# Patient Record
Sex: FEMALE | URBAN - METROPOLITAN AREA
[De-identification: names, ages, dates, MRNs, and addresses within clinical notes are randomized per-mention and may not be internally consistent; named-entity substitution may affect disease eponyms.]

---

## 2023-04-28 ENCOUNTER — INPATIENT (INPATIENT)
Facility: HOSPITAL | Age: 33
LOS: 1 days | Discharge: HOME | DRG: 57 | End: 2023-04-30
Attending: PSYCHIATRY & NEUROLOGY | Admitting: PSYCHIATRY & NEUROLOGY
Payer: SUBSIDIZED

## 2023-04-28 VITALS
RESPIRATION RATE: 18 BRPM | OXYGEN SATURATION: 97 % | DIASTOLIC BLOOD PRESSURE: 113 MMHG | SYSTOLIC BLOOD PRESSURE: 181 MMHG | WEIGHT: 293 LBS | HEART RATE: 95 BPM | HEIGHT: 69 IN | TEMPERATURE: 98 F

## 2023-04-28 DIAGNOSIS — I63.9 CEREBRAL INFARCTION, UNSPECIFIED: ICD-10-CM

## 2023-04-28 LAB
ALBUMIN SERPL ELPH-MCNC: 3.9 G/DL — SIGNIFICANT CHANGE UP (ref 3.3–5)
ALP SERPL-CCNC: 95 U/L — SIGNIFICANT CHANGE UP (ref 40–120)
ALT FLD-CCNC: 21 U/L — SIGNIFICANT CHANGE UP (ref 10–45)
ANION GAP SERPL CALC-SCNC: 11 MMOL/L — SIGNIFICANT CHANGE UP (ref 5–17)
APTT BLD: 28.7 SEC — SIGNIFICANT CHANGE UP (ref 27.5–35.5)
AST SERPL-CCNC: 13 U/L — SIGNIFICANT CHANGE UP (ref 10–40)
BASE EXCESS BLDV CALC-SCNC: 6.1 MMOL/L — HIGH (ref -2–3)
BILIRUB SERPL-MCNC: 0.1 MG/DL — LOW (ref 0.2–1.2)
BLOOD GAS VENOUS - CREATININE: SIGNIFICANT CHANGE UP MG/DL (ref 0.5–1.3)
BUN SERPL-MCNC: 10 MG/DL — SIGNIFICANT CHANGE UP (ref 7–23)
CA-I SERPL-SCNC: 1.21 MMOL/L — SIGNIFICANT CHANGE UP (ref 1.15–1.33)
CALCIUM SERPL-MCNC: 8.8 MG/DL — SIGNIFICANT CHANGE UP (ref 8.4–10.5)
CHLORIDE BLDV-SCNC: 105 MMOL/L — SIGNIFICANT CHANGE UP (ref 96–108)
CHLORIDE SERPL-SCNC: 102 MMOL/L — SIGNIFICANT CHANGE UP (ref 96–108)
CO2 BLDV-SCNC: 32 MMOL/L — HIGH (ref 22–26)
CO2 SERPL-SCNC: 28 MMOL/L — SIGNIFICANT CHANGE UP (ref 22–31)
CREAT SERPL-MCNC: 0.78 MG/DL — SIGNIFICANT CHANGE UP (ref 0.5–1.3)
EGFR: 103 ML/MIN/1.73M2 — SIGNIFICANT CHANGE UP
GAS PNL BLDV: 140 MMOL/L — SIGNIFICANT CHANGE UP (ref 136–145)
GAS PNL BLDV: SIGNIFICANT CHANGE UP
GAS PNL BLDV: SIGNIFICANT CHANGE UP
GLUCOSE BLDV-MCNC: 122 MG/DL — HIGH (ref 70–99)
GLUCOSE SERPL-MCNC: 119 MG/DL — HIGH (ref 70–99)
HCO3 BLDV-SCNC: 31 MMOL/L — HIGH (ref 22–29)
HCT VFR BLD CALC: 31.5 % — LOW (ref 34.5–45)
HCT VFR BLDA CALC: 31 % — LOW (ref 34.5–46.5)
HGB BLD CALC-MCNC: 10.2 G/DL — LOW (ref 11.7–16.1)
HGB BLD-MCNC: 9.8 G/DL — LOW (ref 11.5–15.5)
INR BLD: 1.1 RATIO — SIGNIFICANT CHANGE UP (ref 0.88–1.16)
LACTATE BLDV-MCNC: 1.5 MMOL/L — SIGNIFICANT CHANGE UP (ref 0.5–2)
MCHC RBC-ENTMCNC: 24.6 PG — LOW (ref 27–34)
MCHC RBC-ENTMCNC: 31.1 GM/DL — LOW (ref 32–36)
MCV RBC AUTO: 79.1 FL — LOW (ref 80–100)
PCO2 BLDV: 43 MMHG — HIGH (ref 39–42)
PH BLDV: 7.46 — HIGH (ref 7.32–7.43)
PLATELET # BLD AUTO: 385 K/UL — SIGNIFICANT CHANGE UP (ref 150–400)
PO2 BLDV: 62 MMHG — HIGH (ref 25–45)
POTASSIUM BLDV-SCNC: 3.1 MMOL/L — LOW (ref 3.5–5.1)
POTASSIUM SERPL-MCNC: 3.2 MMOL/L — LOW (ref 3.5–5.3)
POTASSIUM SERPL-SCNC: 3.2 MMOL/L — LOW (ref 3.5–5.3)
PROT SERPL-MCNC: 6.7 G/DL — SIGNIFICANT CHANGE UP (ref 6–8.3)
PROTHROM AB SERPL-ACNC: 12.7 SEC — SIGNIFICANT CHANGE UP (ref 10.5–13.4)
RBC # BLD: 3.98 M/UL — SIGNIFICANT CHANGE UP (ref 3.8–5.2)
RBC # FLD: 17.5 % — HIGH (ref 10.3–14.5)
SAO2 % BLDV: 92.2 % — HIGH (ref 67–88)
SODIUM SERPL-SCNC: 141 MMOL/L — SIGNIFICANT CHANGE UP (ref 135–145)
TROPONIN T, HIGH SENSITIVITY RESULT: 7 NG/L — SIGNIFICANT CHANGE UP (ref 0–51)
WBC # BLD: 6.73 K/UL — SIGNIFICANT CHANGE UP (ref 3.8–10.5)
WBC # FLD AUTO: 6.73 K/UL — SIGNIFICANT CHANGE UP (ref 3.8–10.5)

## 2023-04-28 PROCEDURE — 70450 CT HEAD/BRAIN W/O DYE: CPT | Mod: 26,MA

## 2023-04-28 PROCEDURE — 99291 CRITICAL CARE FIRST HOUR: CPT

## 2023-04-28 PROCEDURE — 99053 MED SERV 10PM-8AM 24 HR FAC: CPT

## 2023-04-28 PROCEDURE — 71045 X-RAY EXAM CHEST 1 VIEW: CPT | Mod: 26

## 2023-04-28 RX ORDER — DIPHENHYDRAMINE HCL 50 MG
50 CAPSULE ORAL ONCE
Refills: 0 | Status: COMPLETED | OUTPATIENT
Start: 2023-04-28 | End: 2023-04-28

## 2023-04-28 RX ORDER — TENECTEPLASE 50 MG
25 KIT INTRAVENOUS ONCE
Refills: 0 | Status: COMPLETED | OUTPATIENT
Start: 2023-04-28 | End: 2023-04-28

## 2023-04-28 RX ORDER — DIPHENHYDRAMINE HCL 50 MG
50 CAPSULE ORAL ONCE
Refills: 0 | Status: DISCONTINUED | OUTPATIENT
Start: 2023-04-29 | End: 2023-04-29

## 2023-04-28 RX ORDER — HYDROMORPHONE HYDROCHLORIDE 2 MG/ML
1 INJECTION INTRAMUSCULAR; INTRAVENOUS; SUBCUTANEOUS ONCE
Refills: 0 | Status: DISCONTINUED | OUTPATIENT
Start: 2023-04-28 | End: 2023-04-28

## 2023-04-28 RX ORDER — DIPHENHYDRAMINE HCL 50 MG
25 CAPSULE ORAL ONCE
Refills: 0 | Status: COMPLETED | OUTPATIENT
Start: 2023-04-28 | End: 2023-04-29

## 2023-04-28 RX ORDER — SODIUM CHLORIDE 9 MG/ML
10 INJECTION INTRAMUSCULAR; INTRAVENOUS; SUBCUTANEOUS ONCE
Refills: 0 | Status: COMPLETED | OUTPATIENT
Start: 2023-04-28 | End: 2023-04-28

## 2023-04-28 RX ORDER — HYDROMORPHONE HYDROCHLORIDE 2 MG/ML
2 INJECTION INTRAMUSCULAR; INTRAVENOUS; SUBCUTANEOUS
Refills: 0 | Status: DISCONTINUED | OUTPATIENT
Start: 2023-04-28 | End: 2023-04-29

## 2023-04-28 RX ADMIN — HYDROMORPHONE HYDROCHLORIDE 1 MILLIGRAM(S): 2 INJECTION INTRAMUSCULAR; INTRAVENOUS; SUBCUTANEOUS at 22:52

## 2023-04-28 RX ADMIN — SODIUM CHLORIDE 10 MILLILITER(S): 9 INJECTION INTRAMUSCULAR; INTRAVENOUS; SUBCUTANEOUS at 22:52

## 2023-04-28 RX ADMIN — Medication 50 MILLIGRAM(S): at 22:50

## 2023-04-28 RX ADMIN — TENECTEPLASE 3600 MILLIGRAM(S): KIT at 22:53

## 2023-04-28 RX ADMIN — SODIUM CHLORIDE 10 MILLILITER(S): 9 INJECTION INTRAMUSCULAR; INTRAVENOUS; SUBCUTANEOUS at 22:54

## 2023-04-28 RX ADMIN — HYDROMORPHONE HYDROCHLORIDE 1 MILLIGRAM(S): 2 INJECTION INTRAMUSCULAR; INTRAVENOUS; SUBCUTANEOUS at 22:50

## 2023-04-28 NOTE — ED PROVIDER NOTE - ATTENDING CONTRIBUTION TO CARE
HX: Extensive medical hx including DVT, Factor V Leiden, PFO, prior stroke with R sided mild weakness, CAD with stent, renal cell carcinoma on chemo, last dose one week ago, sickle cell anemia, heart failure, Aortic valve replacement bovine, present with ongoing chest pain c/b acute LEFT side weakness L arm and leg with change in mental status.  Onset PTA.  See neuro and nurse notes, pt code stroke, H&P done concurrently with stroke team.    PE: well appearing, 4/5 motor L UE and L LE, face symmetric, chest wall without rash, port L chest wall without cellulitis, clear b/l, ab soft nt, no respiratory distress.    MDM: acute stroke with L side weakness, sickle cell pain crisis, ct head, check cbc r/o anemia or leukocytosis, check bmp to r/o metabolic derangement and lyte imbalance, consider antithrombolytics, pain control, limit fluids.     Progress Note 2300: delay in giving tenectaplase due to difficulty obtaining port access, pt initially resistant to IV placement but eventually allowed after explanation of time-sensitive nature and failed first attempt for port access, u/s guided IV placed with good blood return, tenectaplase administered, pt stable for stroke unit.  Labs pending .

## 2023-04-28 NOTE — ED ADULT NURSE NOTE - OBJECTIVE STATEMENT
32 year old female coming in for left sided numbness and weakness. Pt has a complex PMH. Pt states she has been in a sickle cell crisis for two days. PT has been c/o of pain with nausea dn vomiting. Today the patient states she was standing gin her kitchen she got a sharp pain in her chest that radiated to her left arm and jaw. She then felt weak on the left side and collapsed. As per family the heard her hit the floor, she was "out" for about 4-5 minutes. When she woke up she was confused. On arrival the patient had notable left sided weakness and a code stroke was called from triage @[id number]

## 2023-04-28 NOTE — ED PROVIDER NOTE - WR ORDER DATE AND TIME 1
Please call to let know that evaluation of pt's immune system showed that pt may benefit from an extra vaccine, Pneumovax, to decrease frequency of infections. Please schedule follow up appointment to review labs and discuss Pneumovax vaccine. Allergy tests are negative  
28-Apr-2023 22:26

## 2023-04-28 NOTE — ED PROVIDER NOTE - CLINICAL SUMMARY MEDICAL DECISION MAKING FREE TEXT BOX
Dr. Rudolph Note: see attg statement below Dr. Rudolph Note: see attg statement below      S Bubba PGY2  young female with complicated medical hx including multiple VTE in the past, PFO, CVAs presents as stroke code for sudden onset of L sided weakness and diminished sensation within the TNK window with simultaneous symptoms of chest pain and nausea. concern for embolic phenomenon in the setting of simultaneous onset of numerous systems. ddx includes vasocclusion from sickled cells v embolic phenomenon v acute chest with concurrent stroke. most likely is embolic phenomenon in the setting of known uncorrected pfo and multisystem involvement. joint decision with neuro to push tnk. pain control and judicious fluids in the setting of poor EF. pt has close follow up with all her specialists in [place]. TBA.

## 2023-04-28 NOTE — ED PROVIDER NOTE - NS ED ROS FT
+R leg pain +R leg pain      S Bubba PGY2   Constitutional: no fevers, chills  HEENT: no HA, vision changes, rhinorrhea, sore throat  Cardiac: +chest pain, palpitations  Respiratory: no SOB, cough or hemoptysis  GI: no n/v/d/c, abd pain, bloody or dark stools  : no dysuria, frequency, or hematuria  MSK: no joint pain, neck pain or back pain  Skin: no rashes, jaundice, pruritis  Neuro: +numbness/tingling, weakness, unsteady gait  Psych: no suicidal thoughts  ROS otherwise negative except per HPI

## 2023-04-28 NOTE — ED PROVIDER NOTE - PHYSICAL EXAMINATION
Dr. Rudolph Note: see attg statement below Dr. Rudolph Note: see attg statement below      WESLY Mac PGY2   General: non-toxic, NAD  HEENT: NCAT, PERRL, oropharyngeal AW patent  Cardiac: RRR, no murmurs, 2+ peripheral pulses  Resp: CTAB, normal rate  Abdomen: soft, non-distended, bowel sounds present, no ttp, no rebound or guarding. no organomegaly  Extremities: no peripheral edema, +R calf tenderness, no leg size discrepancies  Skin: warm and dry no rashes  Neuro: AAOx4, L sided sensation diminished to face and extremities, 4/5 strength in left upper and lower extremities. CN 2-12 intact. NIHS 4  Psych: mood and affect appropriate

## 2023-04-28 NOTE — ED PROVIDER NOTE - OBJECTIVE STATEMENT
Dr. Rudolph Note: see attg statement below Dr. Rudolph Note: see attg statement below    S Bubba PGY2 32-year-old female with history of sickle cell disease, acute chest syndrome, renal cell carcinoma on chemotherapy every 2 weeks, last 1 week ago, CVA 2019 with right-sided residual relative weakness, DVTs, factor V Leiden, CHF, atrial fibrillation, MI 2015, bovine aortic valve replacement, PFO, epilepsy presents with acute onset of left-sided sensation deficit and weakness at 930, immediately preceded by chest pain radiating to the left arm in the setting of 2 days of persistent sickle cell pain.  No fevers.  +nausea

## 2023-04-29 DIAGNOSIS — F41.9 ANXIETY DISORDER, UNSPECIFIED: ICD-10-CM

## 2023-04-29 DIAGNOSIS — C64.9 MALIGNANT NEOPLASM OF UNSPECIFIED KIDNEY, EXCEPT RENAL PELVIS: ICD-10-CM

## 2023-04-29 DIAGNOSIS — R53.1 WEAKNESS: ICD-10-CM

## 2023-04-29 DIAGNOSIS — D57.00 HB-SS DISEASE WITH CRISIS, UNSPECIFIED: ICD-10-CM

## 2023-04-29 DIAGNOSIS — Z90.49 ACQUIRED ABSENCE OF OTHER SPECIFIED PARTS OF DIGESTIVE TRACT: Chronic | ICD-10-CM

## 2023-04-29 DIAGNOSIS — I82.90 ACUTE EMBOLISM AND THROMBOSIS OF UNSPECIFIED VEIN: ICD-10-CM

## 2023-04-29 LAB
ANION GAP SERPL CALC-SCNC: 9 MMOL/L — SIGNIFICANT CHANGE UP (ref 5–17)
ANISOCYTOSIS BLD QL: SLIGHT — SIGNIFICANT CHANGE UP
APTT BLD: 27 SEC — LOW (ref 27.5–35.5)
BASOPHILS # BLD AUTO: 0 K/UL — SIGNIFICANT CHANGE UP (ref 0–0.2)
BASOPHILS NFR BLD AUTO: 0 % — SIGNIFICANT CHANGE UP (ref 0–2)
BUN SERPL-MCNC: 10 MG/DL — SIGNIFICANT CHANGE UP (ref 7–23)
CALCIUM SERPL-MCNC: 8.8 MG/DL — SIGNIFICANT CHANGE UP (ref 8.4–10.5)
CHLORIDE SERPL-SCNC: 103 MMOL/L — SIGNIFICANT CHANGE UP (ref 96–108)
CHOLEST SERPL-MCNC: 179 MG/DL — SIGNIFICANT CHANGE UP
CO2 SERPL-SCNC: 28 MMOL/L — SIGNIFICANT CHANGE UP (ref 22–31)
CREAT SERPL-MCNC: 0.71 MG/DL — SIGNIFICANT CHANGE UP (ref 0.5–1.3)
EGFR: 115 ML/MIN/1.73M2 — SIGNIFICANT CHANGE UP
EOSINOPHIL # BLD AUTO: 0 K/UL — SIGNIFICANT CHANGE UP (ref 0–0.5)
EOSINOPHIL NFR BLD AUTO: 0 % — SIGNIFICANT CHANGE UP (ref 0–6)
FLUAV AG NPH QL: SIGNIFICANT CHANGE UP
FLUBV AG NPH QL: SIGNIFICANT CHANGE UP
GLUCOSE SERPL-MCNC: 99 MG/DL — SIGNIFICANT CHANGE UP (ref 70–99)
HCT VFR BLD CALC: 33.3 % — LOW (ref 34.5–45)
HDLC SERPL-MCNC: 42 MG/DL — LOW
HGB BLD-MCNC: 10.3 G/DL — LOW (ref 11.5–15.5)
INR BLD: 1.07 RATIO — SIGNIFICANT CHANGE UP (ref 0.88–1.16)
LDH SERPL L TO P-CCNC: 262 U/L — HIGH (ref 50–242)
LIPID PNL WITH DIRECT LDL SERPL: 116 MG/DL — HIGH
LYMPHOCYTES # BLD AUTO: 2.54 K/UL — SIGNIFICANT CHANGE UP (ref 1–3.3)
LYMPHOCYTES # BLD AUTO: 37.7 % — SIGNIFICANT CHANGE UP (ref 13–44)
MANUAL SMEAR VERIFICATION: SIGNIFICANT CHANGE UP
MCHC RBC-ENTMCNC: 24.5 PG — LOW (ref 27–34)
MCHC RBC-ENTMCNC: 30.9 GM/DL — LOW (ref 32–36)
MCV RBC AUTO: 79.1 FL — LOW (ref 80–100)
MONOCYTES # BLD AUTO: 0.3 K/UL — SIGNIFICANT CHANGE UP (ref 0–0.9)
MONOCYTES NFR BLD AUTO: 4.4 % — SIGNIFICANT CHANGE UP (ref 2–14)
NEUTROPHILS # BLD AUTO: 3.9 K/UL — SIGNIFICANT CHANGE UP (ref 1.8–7.4)
NEUTROPHILS NFR BLD AUTO: 57.9 % — SIGNIFICANT CHANGE UP (ref 43–77)
NON HDL CHOLESTEROL: 136 MG/DL — HIGH
NRBC # BLD: 0 /100 WBCS — SIGNIFICANT CHANGE UP (ref 0–0)
NRBC # BLD: 1 /100 — HIGH (ref 0–0)
PLAT MORPH BLD: NORMAL — SIGNIFICANT CHANGE UP
PLATELET # BLD AUTO: 423 K/UL — HIGH (ref 150–400)
POIKILOCYTOSIS BLD QL AUTO: SLIGHT — SIGNIFICANT CHANGE UP
POTASSIUM SERPL-MCNC: 3.6 MMOL/L — SIGNIFICANT CHANGE UP (ref 3.5–5.3)
POTASSIUM SERPL-SCNC: 3.6 MMOL/L — SIGNIFICANT CHANGE UP (ref 3.5–5.3)
PROTHROM AB SERPL-ACNC: 12.3 SEC — SIGNIFICANT CHANGE UP (ref 10.5–13.4)
RBC # BLD: 4.21 M/UL — SIGNIFICANT CHANGE UP (ref 3.8–5.2)
RBC # BLD: 4.21 M/UL — SIGNIFICANT CHANGE UP (ref 3.8–5.2)
RBC # FLD: 17.2 % — HIGH (ref 10.3–14.5)
RBC BLD AUTO: SIGNIFICANT CHANGE UP
RETICS #: 56.5 K/UL — SIGNIFICANT CHANGE UP (ref 25–125)
RETICS/RBC NFR: 1.3 % — SIGNIFICANT CHANGE UP (ref 0.5–2.5)
RSV RNA NPH QL NAA+NON-PROBE: SIGNIFICANT CHANGE UP
SARS-COV-2 RNA SPEC QL NAA+PROBE: SIGNIFICANT CHANGE UP
SODIUM SERPL-SCNC: 140 MMOL/L — SIGNIFICANT CHANGE UP (ref 135–145)
TRIGL SERPL-MCNC: 100 MG/DL — SIGNIFICANT CHANGE UP
WBC # BLD: 7.92 K/UL — SIGNIFICANT CHANGE UP (ref 3.8–10.5)
WBC # FLD AUTO: 7.92 K/UL — SIGNIFICANT CHANGE UP (ref 3.8–10.5)

## 2023-04-29 PROCEDURE — 99222 1ST HOSP IP/OBS MODERATE 55: CPT

## 2023-04-29 PROCEDURE — 70498 CT ANGIOGRAPHY NECK: CPT | Mod: 26

## 2023-04-29 PROCEDURE — 99418 PROLNG IP/OBS E/M EA 15 MIN: CPT

## 2023-04-29 PROCEDURE — 70496 CT ANGIOGRAPHY HEAD: CPT | Mod: 26

## 2023-04-29 PROCEDURE — 0042T: CPT

## 2023-04-29 PROCEDURE — 99223 1ST HOSP IP/OBS HIGH 75: CPT

## 2023-04-29 PROCEDURE — 99223 1ST HOSP IP/OBS HIGH 75: CPT | Mod: GC

## 2023-04-29 RX ORDER — LANOLIN ALCOHOL/MO/W.PET/CERES
5 CREAM (GRAM) TOPICAL AT BEDTIME
Refills: 0 | Status: DISCONTINUED | OUTPATIENT
Start: 2023-04-29 | End: 2023-04-30

## 2023-04-29 RX ORDER — CLONAZEPAM 1 MG
2 TABLET ORAL ONCE
Refills: 0 | Status: DISCONTINUED | OUTPATIENT
Start: 2023-04-29 | End: 2023-04-29

## 2023-04-29 RX ORDER — CLONAZEPAM 1 MG
2 TABLET ORAL
Refills: 0 | Status: DISCONTINUED | OUTPATIENT
Start: 2023-04-29 | End: 2023-04-30

## 2023-04-29 RX ORDER — DIPHENHYDRAMINE HCL 50 MG
25 CAPSULE ORAL ONCE
Refills: 0 | Status: COMPLETED | OUTPATIENT
Start: 2023-04-29 | End: 2023-04-29

## 2023-04-29 RX ORDER — DIPHENHYDRAMINE HCL 50 MG
50 CAPSULE ORAL ONCE
Refills: 0 | Status: DISCONTINUED | OUTPATIENT
Start: 2023-04-29 | End: 2023-04-29

## 2023-04-29 RX ORDER — HYDROMORPHONE HYDROCHLORIDE 2 MG/ML
4 INJECTION INTRAMUSCULAR; INTRAVENOUS; SUBCUTANEOUS EVERY 4 HOURS
Refills: 0 | Status: DISCONTINUED | OUTPATIENT
Start: 2023-04-29 | End: 2023-04-30

## 2023-04-29 RX ORDER — HYDROMORPHONE HYDROCHLORIDE 2 MG/ML
3 INJECTION INTRAMUSCULAR; INTRAVENOUS; SUBCUTANEOUS ONCE
Refills: 0 | Status: DISCONTINUED | OUTPATIENT
Start: 2023-04-29 | End: 2023-04-29

## 2023-04-29 RX ORDER — QUETIAPINE FUMARATE 200 MG/1
100 TABLET, FILM COATED ORAL THREE TIMES A DAY
Refills: 0 | Status: DISCONTINUED | OUTPATIENT
Start: 2023-04-29 | End: 2023-04-30

## 2023-04-29 RX ORDER — CLONAZEPAM 1 MG
2 TABLET ORAL DAILY
Refills: 0 | Status: DISCONTINUED | OUTPATIENT
Start: 2023-04-29 | End: 2023-04-29

## 2023-04-29 RX ORDER — CYCLOBENZAPRINE HYDROCHLORIDE 10 MG/1
10 TABLET, FILM COATED ORAL THREE TIMES A DAY
Refills: 0 | Status: DISCONTINUED | OUTPATIENT
Start: 2023-04-29 | End: 2023-04-30

## 2023-04-29 RX ORDER — OLANZAPINE 15 MG/1
5 TABLET, FILM COATED ORAL ONCE
Refills: 0 | Status: DISCONTINUED | OUTPATIENT
Start: 2023-04-29 | End: 2023-04-29

## 2023-04-29 RX ORDER — HYDROMORPHONE HYDROCHLORIDE 2 MG/ML
2 INJECTION INTRAMUSCULAR; INTRAVENOUS; SUBCUTANEOUS EVERY 6 HOURS
Refills: 0 | Status: DISCONTINUED | OUTPATIENT
Start: 2023-04-29 | End: 2023-04-29

## 2023-04-29 RX ORDER — TRAZODONE HCL 50 MG
200 TABLET ORAL AT BEDTIME
Refills: 0 | Status: DISCONTINUED | OUTPATIENT
Start: 2023-04-29 | End: 2023-04-30

## 2023-04-29 RX ORDER — DIPHENHYDRAMINE HCL 50 MG
50 CAPSULE ORAL EVERY 4 HOURS
Refills: 0 | Status: DISCONTINUED | OUTPATIENT
Start: 2023-04-29 | End: 2023-04-29

## 2023-04-29 RX ORDER — LISINOPRIL 2.5 MG/1
10 TABLET ORAL DAILY
Refills: 0 | Status: DISCONTINUED | OUTPATIENT
Start: 2023-04-29 | End: 2023-04-30

## 2023-04-29 RX ORDER — HYDROMORPHONE HYDROCHLORIDE 2 MG/ML
4 INJECTION INTRAMUSCULAR; INTRAVENOUS; SUBCUTANEOUS EVERY 6 HOURS
Refills: 0 | Status: DISCONTINUED | OUTPATIENT
Start: 2023-04-29 | End: 2023-04-29

## 2023-04-29 RX ORDER — GABAPENTIN 400 MG/1
800 CAPSULE ORAL THREE TIMES A DAY
Refills: 0 | Status: DISCONTINUED | OUTPATIENT
Start: 2023-04-29 | End: 2023-04-30

## 2023-04-29 RX ORDER — MORPHINE SULFATE 50 MG/1
30 CAPSULE, EXTENDED RELEASE ORAL EVERY 8 HOURS
Refills: 0 | Status: DISCONTINUED | OUTPATIENT
Start: 2023-04-29 | End: 2023-04-29

## 2023-04-29 RX ORDER — DIPHENHYDRAMINE HCL 50 MG
50 CAPSULE ORAL ONCE
Refills: 0 | Status: COMPLETED | OUTPATIENT
Start: 2023-04-29 | End: 2023-04-29

## 2023-04-29 RX ORDER — DIPHENHYDRAMINE HCL 50 MG
50 CAPSULE ORAL EVERY 4 HOURS
Refills: 0 | Status: DISCONTINUED | OUTPATIENT
Start: 2023-04-29 | End: 2023-04-30

## 2023-04-29 RX ORDER — CITALOPRAM 10 MG/1
20 TABLET, FILM COATED ORAL
Refills: 0 | Status: DISCONTINUED | OUTPATIENT
Start: 2023-04-29 | End: 2023-04-30

## 2023-04-29 RX ORDER — PRAZOSIN HCL 2 MG
4 CAPSULE ORAL AT BEDTIME
Refills: 0 | Status: DISCONTINUED | OUTPATIENT
Start: 2023-04-29 | End: 2023-04-30

## 2023-04-29 RX ORDER — MORPHINE SULFATE 50 MG/1
10 CAPSULE, EXTENDED RELEASE ORAL ONCE
Refills: 0 | Status: DISCONTINUED | OUTPATIENT
Start: 2023-04-29 | End: 2023-04-29

## 2023-04-29 RX ORDER — METOPROLOL TARTRATE 50 MG
25 TABLET ORAL DAILY
Refills: 0 | Status: DISCONTINUED | OUTPATIENT
Start: 2023-04-29 | End: 2023-04-30

## 2023-04-29 RX ORDER — HYDROXYUREA 500 MG/1
500 CAPSULE ORAL DAILY
Refills: 0 | Status: DISCONTINUED | OUTPATIENT
Start: 2023-04-29 | End: 2023-04-30

## 2023-04-29 RX ADMIN — QUETIAPINE FUMARATE 100 MILLIGRAM(S): 200 TABLET, FILM COATED ORAL at 17:57

## 2023-04-29 RX ADMIN — Medication 50 MILLIGRAM(S): at 10:02

## 2023-04-29 RX ADMIN — Medication 200 MILLIGRAM(S): at 21:26

## 2023-04-29 RX ADMIN — Medication 25 MILLIGRAM(S): at 12:08

## 2023-04-29 RX ADMIN — CITALOPRAM 20 MILLIGRAM(S): 10 TABLET, FILM COATED ORAL at 22:33

## 2023-04-29 RX ADMIN — HYDROMORPHONE HYDROCHLORIDE 2 MILLIGRAM(S): 2 INJECTION INTRAMUSCULAR; INTRAVENOUS; SUBCUTANEOUS at 00:00

## 2023-04-29 RX ADMIN — Medication 5 MILLIGRAM(S): at 21:24

## 2023-04-29 RX ADMIN — Medication 2 MILLIGRAM(S): at 13:52

## 2023-04-29 RX ADMIN — LISINOPRIL 10 MILLIGRAM(S): 2.5 TABLET ORAL at 05:20

## 2023-04-29 RX ADMIN — HYDROMORPHONE HYDROCHLORIDE 2 MILLIGRAM(S): 2 INJECTION INTRAMUSCULAR; INTRAVENOUS; SUBCUTANEOUS at 10:02

## 2023-04-29 RX ADMIN — HYDROXYUREA 500 MILLIGRAM(S): 500 CAPSULE ORAL at 11:51

## 2023-04-29 RX ADMIN — MORPHINE SULFATE 30 MILLIGRAM(S): 50 CAPSULE, EXTENDED RELEASE ORAL at 21:26

## 2023-04-29 RX ADMIN — GABAPENTIN 800 MILLIGRAM(S): 400 CAPSULE ORAL at 21:24

## 2023-04-29 RX ADMIN — MORPHINE SULFATE 30 MILLIGRAM(S): 50 CAPSULE, EXTENDED RELEASE ORAL at 14:50

## 2023-04-29 RX ADMIN — MORPHINE SULFATE 10 MILLIGRAM(S): 50 CAPSULE, EXTENDED RELEASE ORAL at 04:40

## 2023-04-29 RX ADMIN — CITALOPRAM 20 MILLIGRAM(S): 10 TABLET, FILM COATED ORAL at 11:55

## 2023-04-29 RX ADMIN — HYDROMORPHONE HYDROCHLORIDE 3 MILLIGRAM(S): 2 INJECTION INTRAMUSCULAR; INTRAVENOUS; SUBCUTANEOUS at 07:30

## 2023-04-29 RX ADMIN — Medication 50 MILLIGRAM(S): at 02:26

## 2023-04-29 RX ADMIN — Medication 2 MILLIGRAM(S): at 00:32

## 2023-04-29 RX ADMIN — CYCLOBENZAPRINE HYDROCHLORIDE 10 MILLIGRAM(S): 10 TABLET, FILM COATED ORAL at 05:21

## 2023-04-29 RX ADMIN — Medication 2 MILLIGRAM(S): at 08:31

## 2023-04-29 RX ADMIN — Medication 50 MILLIGRAM(S): at 06:52

## 2023-04-29 RX ADMIN — Medication 25 MILLIGRAM(S): at 11:00

## 2023-04-29 RX ADMIN — HYDROMORPHONE HYDROCHLORIDE 2 MILLIGRAM(S): 2 INJECTION INTRAMUSCULAR; INTRAVENOUS; SUBCUTANEOUS at 10:30

## 2023-04-29 RX ADMIN — Medication 2 MILLIGRAM(S): at 21:24

## 2023-04-29 RX ADMIN — Medication 25 MILLIGRAM(S): at 17:57

## 2023-04-29 RX ADMIN — Medication 4 MILLIGRAM(S): at 22:33

## 2023-04-29 RX ADMIN — HYDROMORPHONE HYDROCHLORIDE 4 MILLIGRAM(S): 2 INJECTION INTRAMUSCULAR; INTRAVENOUS; SUBCUTANEOUS at 21:27

## 2023-04-29 RX ADMIN — GABAPENTIN 800 MILLIGRAM(S): 400 CAPSULE ORAL at 05:21

## 2023-04-29 RX ADMIN — HYDROMORPHONE HYDROCHLORIDE 4 MILLIGRAM(S): 2 INJECTION INTRAMUSCULAR; INTRAVENOUS; SUBCUTANEOUS at 17:57

## 2023-04-29 RX ADMIN — HYDROMORPHONE HYDROCHLORIDE 3 MILLIGRAM(S): 2 INJECTION INTRAMUSCULAR; INTRAVENOUS; SUBCUTANEOUS at 06:53

## 2023-04-29 RX ADMIN — Medication 50 MILLIGRAM(S): at 13:52

## 2023-04-29 RX ADMIN — Medication 50 MILLIGRAM(S): at 21:20

## 2023-04-29 RX ADMIN — HYDROMORPHONE HYDROCHLORIDE 2 MILLIGRAM(S): 2 INJECTION INTRAMUSCULAR; INTRAVENOUS; SUBCUTANEOUS at 00:02

## 2023-04-29 RX ADMIN — MORPHINE SULFATE 10 MILLIGRAM(S): 50 CAPSULE, EXTENDED RELEASE ORAL at 05:10

## 2023-04-29 RX ADMIN — MORPHINE SULFATE 30 MILLIGRAM(S): 50 CAPSULE, EXTENDED RELEASE ORAL at 13:52

## 2023-04-29 RX ADMIN — CYCLOBENZAPRINE HYDROCHLORIDE 10 MILLIGRAM(S): 10 TABLET, FILM COATED ORAL at 21:30

## 2023-04-29 RX ADMIN — HYDROMORPHONE HYDROCHLORIDE 2 MILLIGRAM(S): 2 INJECTION INTRAMUSCULAR; INTRAVENOUS; SUBCUTANEOUS at 02:26

## 2023-04-29 RX ADMIN — QUETIAPINE FUMARATE 100 MILLIGRAM(S): 200 TABLET, FILM COATED ORAL at 05:21

## 2023-04-29 RX ADMIN — Medication 25 MILLIGRAM(S): at 00:01

## 2023-04-29 RX ADMIN — GABAPENTIN 800 MILLIGRAM(S): 400 CAPSULE ORAL at 13:52

## 2023-04-29 RX ADMIN — Medication 40 MILLIGRAM(S): at 04:08

## 2023-04-29 RX ADMIN — HYDROMORPHONE HYDROCHLORIDE 4 MILLIGRAM(S): 2 INJECTION INTRAMUSCULAR; INTRAVENOUS; SUBCUTANEOUS at 18:30

## 2023-04-29 RX ADMIN — QUETIAPINE FUMARATE 100 MILLIGRAM(S): 200 TABLET, FILM COATED ORAL at 21:25

## 2023-04-29 NOTE — BH CONSULTATION LIAISON ASSESSMENT NOTE - OTHER
chel[place]ammie in [place] tenuous no evidence of AH or VH, although reported as in HPI; no internal preoccupation; not responding to internal stimuli patient lying in bed
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
## 2023-04-29 NOTE — SWALLOW BEDSIDE ASSESSMENT ADULT - H & P REVIEW
31 yo F presented to [facility] for left face numbness, left arm and leg drift since 9:30PM. LKN 9PM. She reports a Hx of Sickle cell disease, Factor V leiden, 5 DVT in R leg, Seizures, PTSD, depression, PE, Prior stroke with R side deficits (no residual), heart surgery 2015, umbilical hernia repair, gallbladder removal, PFO. She states she is having blurry vision, headache 8/10, chest pain, pain in back, hips, joints, difficulty walking, feeling off balance, nausea. vomiting. Pt reports allergy to CT contrast. She was initially refusing premedication protocol for CT imaging, so CTP and CTA delayed. She refused MRA due to claustrophobia despite being offered sedatives. CT non con did not show hemorrhage. Tenecteplase given at 22:53 by ED attending; neurology present at bedside. Delay in tenecteplase administration due to risk benefit discussion and need for pt consent as well as placement of venous access. Pt admitted to stroke service for monitoring, with hematology following for management of sickle cell. Overnight, kept threatening to leave AMA. Stroke team at bedside multiple times overnight to discuss risks with pt. Pt also refused premedication for contrast allergy multiple times, delaying imaging, despite discussion of risks and benefits. pre mRS: 1. LKN: 2100 4/28/23. NIHSS:4. Per Neuro: Impression: Left sided hemiparesis and hemisensory deficits, likely sensorimotor ischemic stroke, in the setting of hypercoagulability secondary to sickle cell and factor V leiden./yes

## 2023-04-29 NOTE — CONSULT NOTE ADULT - ASSESSMENT
32 F w/ ?Sickle cell, ?renal cell carcinoma on chemo, hx of ?multiple DVTs/PE, factor V Leiden, hx of ?seizures, PTSD, depression, prior stroke w/ no residual deficits, ?heart surgery 2015 w/ aortic valve replacement for unclear indication, PFO, admitted for L sided weakness s/p tenecteplase. Medicine consulted to assist with sickle cell. Given that patient's retic is normal, Hgb is 10, Tbili not elevated, low suspicion patient is in crisis. Hemoglobin electrophoresis is pending to verify if pt has sickle.

## 2023-04-29 NOTE — CONSULT NOTE ADULT - PROBLEM SELECTOR RECOMMENDATION 5
-per pt has hx of multiple DVTs and PE, w/ possible underlying Factor V Leiden; reports self-discontinuing coumadin 3 weeks ago  -will need further collateral information from her hematologist  -no a/c given pt is s/p tenecteplase
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
## 2023-04-29 NOTE — SPEECH LANGUAGE PATHOLOGY EVALUATION - SLP DIAGNOSIS
Pt presents with evidence of possible cognitive-linguistic impairment notable for intermittent word finding difficulty for lower frequency vocabulary items, flat affect, latent speed of processing, reduced verbal fluency for phonemic fluency naming task and for category switching task. Question component of reduced effort / motivation, given flat affect. Pt overall with expressive and receptive language that appear grossly intact, with the exception of occasional anomia. Pt with motor speech notable for mildly reduced rate, and reduced vocal intensity, but grossly intact articulatory precision. Evaluation interrupted by arrival of Internal Medicine MD, with whom Pt was eager to speak. Therefore, not all tasks completed at this time. Will f/u for ongoing assessment at a later date.

## 2023-04-29 NOTE — H&P ADULT - HISTORY OF PRESENT ILLNESS
Chief Complaint: 31 yo F w sickle cell disease p/w Left sided weakness    HPI:           Allergies:  Protonix (Hives)  IV Contrast (Hives)  Haldol (Anaphylaxis)  Reglan (Hives)  Zofran (Hives)                    Chief Complaint: 33 yo F w sickle cell disease p/w Left sided weakness    HPI: 33 yo F presented to [facility] for left face numbness, left arm and leg drift since 9:30PM. LKN 9PM. She reports a Hx of Sickle cell disease, Factor V leiden, 5 DVT in R leg, Seizures, PTSD, depression, PE, Prior stroke with R side deficits (no residual), heart surgery 2015, umbilical hernia repair, gallbladder removal, PFO. She states she is having blurry vision, headache 8/10, chest pain, pain in back, hips, joints, difficulty walking, feeling off balance, nausea. vomiting.   Patient was within the time frame for tenecteplase. She had significant left sided deficits. BP was less than 180/105. Contraindications were reviewed with patient and ruled out. Consent, risk, benefits, alternatives discussion was held. Patient is not on any anticoagulants, no recent head trauma, no recent surgeries, no recent major injuries.  Tenecteplase was given at 22:53.     Patient reports allergy to CT contrast.     PMH:Sickle cell disease, Factor V leiden, 5 DVT in R leg, Seizures, PTSD, depression, PE, Prior stroke with R side deficits (no residual), heart surgery 2015, umbilical hernia repair, gallbladder removal. PFO  Allergy: CT contrast. Haldol (anaphylaxis). zofran, reglan, protonix, toradol .   Meds: Hydromorphone 4mg, hydroxyurea 500mg daily, benadryl 50mg PRN nausea, Seroquel 100mg TIS, Gabapentin 800mg TID, Flexeril 10mg TID PRN, Metoprolol 25mg daily, Lisinopril 10mg daily, MS contin 30 ER q8h, prazosin 4mg at bedtime, trazodone 200mg at bedtime, celexa 20mg BID, clonipin 2mg PRN anxiety, melatonin 10mg at bedtime  Travel: from [facility]  Hospitalizations: none recent  Immunized for covid  Surgical Hx: heart surgery, cholecystectomy, hernia repair    FH: dad had DM, colon ca, heart attack. Mother had heart attacks, strokes, HTN, sickle cell disease    SH: marijuana, occasional alcohol. no smoking            Allergies:  Protonix (Hives)  IV Contrast (Hives)  Haldol (Anaphylaxis)  Reglan (Hives)  Zofran (Hives)                    Chief Complaint: 31 yo F w sickle cell disease p/w Left sided weakness    HPI: 31 yo F presented to [facility] for left face numbness, left arm and leg drift since 9:30PM. LKN 9PM. She reports a Hx of Sickle cell disease, Factor V leiden, 5 DVT in R leg, Seizures, PTSD, depression, PE, Prior stroke with R side deficits (no residual), heart surgery 2015, umbilical hernia repair, gallbladder removal, PFO. She states she is having blurry vision, headache 8/10, chest pain, pain in back, hips, joints, difficulty walking, feeling off balance, nausea. vomiting.     Patient reports allergy to CT contrast. She was initially refusing premedication protocol for CT imaging, so CTP and CTA delayed. She refused MRA due to claustrophobia despite being offered sedatives.  CT non con did not show hemorrhage.    Patient was within the time frame for tenecteplase. She had significant left sided deficits. BP was less than 180/105. Contraindications were reviewed with patient and ruled out. Consent, risk, benefits, alternatives discussion was held. Patient is not on any anticoagulants, no recent head trauma, no recent surgeries, no recent major injuries.  Tenecteplase was given at 22:53 by ED attending; neurology present at bedside. Delay in tenecteplase administration due to risk benefit discussion and need for patient consent as well as placement of venous access. Patient will be admitted to stroke service for monitoring, with hematology following for management of sickle cell.     Overnight, patient kept threatening to leave AMA. Stroke team at bedside multiple times overnight to discuss risks with patient. Patient also refused premedication for contrast allergy multiple times, delaying imaging, despite discussion of risks and benefits.     PMH:Sickle cell disease, Factor V leiden, 5 DVT in R leg, Seizures, PTSD, depression, PE, Prior stroke with R side deficits (no residual), heart surgery 2015, umbilical hernia repair, gallbladder removal. PFO  Allergy: CT contrast. Haldol (anaphylaxis). zofran, reglan, protonix, toradol .   Meds: Hydromorphone 4mg, hydroxyurea 500mg daily, benadryl 50mg PRN nausea, Seroquel 100mg TIS, Gabapentin 800mg TID, Flexeril 10mg TID PRN, Metoprolol 25mg daily, Lisinopril 10mg daily, MS contin 30 ER q8h, prazosin 4mg at bedtime, trazodone 200mg at bedtime, celexa 20mg BID, clonipin 2mg PRN anxiety, melatonin 10mg at bedtime  Travel: from [facility]  Hospitalizations: none recent  Immunized for covid  Surgical Hx: heart surgery, cholecystectomy, hernia repair    FH: dad had DM, colon ca, heart attack. Mother had heart attacks, strokes, HTN, sickle cell disease    SH: marijuana, occasional alcohol. no smoking            Allergies:  Protonix (Hives)  IV Contrast (Hives)  Haldol (Anaphylaxis)  Reglan (Hives)  Zofran (Hives)
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
## 2023-04-29 NOTE — CONSULT NOTE ADULT - TIME BILLING
chart review, multiple discussions with patients, phone calls to facilities in [place] to attempt to obtain collateral, documentation, discussion with primary team.

## 2023-04-29 NOTE — CONSULT NOTE ADULT - PROBLEM SELECTOR RECOMMENDATION 4
-Evaluated by psychiatry, appreciate recs  -will need more collateral; per pt she is seen by Dr. Correa who is not searchable on hospital site; per psych note she is seen by Dr. Combs who is also not searchable  -would check EKG for QTc

## 2023-04-29 NOTE — PATIENT PROFILE ADULT - FALL HARM RISK - HARM RISK INTERVENTIONS

## 2023-04-29 NOTE — BH CONSULTATION LIAISON ASSESSMENT NOTE - NSBHCHARTREVIEWLAB_PSY_A_CORE FT
9.8    6.73  )-----------( 385      ( 28 Apr 2023 22:58 )             31.5     04-29    140  |  103  |  10  ----------------------------<  99  3.6   |  28  |  0.71    Ca    8.8      29 Apr 2023 05:38    TPro  6.7  /  Alb  3.9  /  TBili  0.1<L>  /  DBili  x   /  AST  13  /  ALT  21  /  AlkPhos  95  04-28

## 2023-04-29 NOTE — PHYSICAL THERAPY INITIAL EVALUATION ADULT - PERTINENT HX OF CURRENT PROBLEM, REHAB EVAL
31 yo F presented to [facility] for left face numbness, left arm and leg drift since 9:30PM. LKN 9PM. She reports a Hx of Sickle cell disease, Factor V leiden, 5 DVT in R leg, Seizures, PTSD, depression, PE, Prior stroke with R side deficits (no residual), heart surgery 2015, umbilical hernia repair, gallbladder removal, PFO. She states she is having blurry vision, headache 8/10, chest pain, pain in back, hips, joints, difficulty walking, feeling off balance, nausea. vomiting. Patient reports allergy to CT contrast. She was initially refusing premedication protocol for CT imaging, so CTP and CTA delayed. She refused MRA due to claustrophobia despite being offered sedatives. 4/28 CT non con did not show hemorrhage.  Patient was within the time frame for tenecteplase. She had significant left sided deficits. BP was less than 180/105. Contraindications were reviewed with patient and ruled out. Consent, risk, benefits, alternatives discussion was held. Patient is not on any anticoagulants, no recent head trauma, no recent surgeries, no recent major injuries.  Tenecteplase was given at 22:53 by ED attending; neurology present at bedside. Delay in tenecteplase administration due to risk benefit discussion and need for patient consent as well as placement of venous access. Patient will be admitted to stroke service for monitoring, with hematology following for management of sickle cell.
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
## 2023-04-29 NOTE — BH CONSULTATION LIAISON ASSESSMENT NOTE - NSICDXPASTMEDICALHX_GEN_ALL_CORE_FT
PAST MEDICAL HISTORY:  Factor V Leiden     PFO (patent foramen ovale)     Recurrent deep vein thrombosis (DVT)     Renal cell carcinoma     Sickle cell disease     Stroke

## 2023-04-29 NOTE — CONSULT NOTE ADULT - PROBLEM SELECTOR RECOMMENDATION 3
-per pt, diagnosed in 2014, is on chemo every 2 weeks, last was 4/20  -labs not consistent with pt on active chemo  -will need more collateral from patient's primary oncologist Dr. Ranjana Wu at [facility] (unable to locate on hospital site)

## 2023-04-29 NOTE — H&P ADULT - NSHPPHYSICALEXAM_GEN_ALL_CORE
Vitals:  T(C): 36.4 (04-28-23 @ 23:53), Max: 36.7 (04-28-23 @ 22:10)  HR: 83 (04-29-23 @ 00:23) (83 - 95)  BP: 131/121 (04-29-23 @ 00:23) (131/121 - 181/113)  RR: 11 (04-29-23 @ 00:23) (11 - 20)  SpO2: 96% (04-29-23 @ 00:23) (96% - 98%)

## 2023-04-29 NOTE — BH CONSULTATION LIAISON ASSESSMENT NOTE - NSBHCHARTREVIEWVS_PSY_A_CORE FT
Vital Signs Last 24 Hrs  T(C): 36.4 (29 Apr 2023 08:00), Max: 37.1 (29 Apr 2023 01:00)  T(F): 97.5 (29 Apr 2023 08:00), Max: 98.8 (29 Apr 2023 01:00)  HR: 87 (29 Apr 2023 10:00) (75 - 95)  BP: 141/60 (29 Apr 2023 10:00) (125/98 - 181/113)  BP(mean): 111 (29 Apr 2023 09:00) (99 - 127)  RR: 13 (29 Apr 2023 10:00) (11 - 26)  SpO2: 98% (29 Apr 2023 10:00) (92% - 100%)    Parameters below as of 29 Apr 2023 10:00  Patient On (Oxygen Delivery Method): room air

## 2023-04-29 NOTE — BH CONSULTATION LIAISON ASSESSMENT NOTE - DETAILS
2009 via wrist cutting reported reactions to haloperidol, IV contrast, pantoprazole, metoclopramide, ondansetron

## 2023-04-29 NOTE — H&P ADULT - ASSESSMENT
pre mRS:   LKN:   NIHSS:      Impression:     Recommendations:    After Tenecteplase administration at 2253.  >Neurochecks: Every 15 mins x two hours, then every 30 mins x6 hours, then every hour x 16 hours.  >No ron removal or placement for 24 hours  >No venous/arterial puncture at non-compressible site  >No anticoagulation or anti-platelet agents for 24 hours  >Cardene drip as needed to keep BP < 180/105  > Permissive HTN up to /105 for 48 hours then gradual normotension over 2-3 days.   > Intravenous hydration with normal saline at 75cc per hour  > Head of bed > 30 degrees for aspiration prevention and aspiration precautions ordered.  > Stroke education and counseling    [] Admit to stroke unit    [] STAT hematology  [] Please repeat CTH in 24 hours after Tenecteplase administration.      [] MRI brain without contrast   [] TTE w/ bubble   [] Lipid panel, HbA1c  [] CBC, CMP, coagulation panel, troponin  [] Atorvastatin 80mg (titrate to LDL < 70)   [] DVT prophylaxis   [] Telemonitoring  [] Baseline EKG and CXR  [] Neurological checks and vital signs per unit protocol  [] BGM goals 140-180  [] NPO for now  [] PT/OT; S/S evaluation  [] Aspirin 81mg daily will be started 24h after Tenecteplase if repeat CTH negative for hemorrhage.     Tenecteplase accept consent: The risks and benefits of IV administration was discussed with patient/family in presence of ED staff. Risks including but not limited to intracranial hemorrhage, major systemic hemorrhage and angioedema. In addition, contraindications to Tenecteplase were reviewed with patient and confirmed to not be present, allowing for administration of Tenecteplase .     Patient was discussed with the stroke fellow Dr. Mason and under supervision with attending Dr. Dale, will be formally staffed in morning rounds. Recommendations finalized once signed by attending    * Case and plan not final until Attending attestation * Chief Complaint: 33 yo F w sickle cell disease p/w Left sided weakness    HPI: 33 yo F presented to [facility] for left face numbness, left arm and leg drift since 9:30PM. LKN 9PM. She reports a Hx of Sickle cell disease, Factor V leiden, 5 DVT in R leg, Seizures, PTSD, depression, PE, Prior stroke with R side deficits (no residual), heart surgery 2015, umbilical hernia repair, gallbladder removal, PFO. She states she is having blurry vision, headache 8/10, chest pain, pain in back, hips, joints, difficulty walking, feeling off balance, nausea. vomiting.     Patient reports allergy to CT contrast. She was initially refusing premedication protocol for CT imaging, so CTP and CTA delayed. She refused MRA due to claustrophobia despite being offered sedatives.  CT non con did not show hemorrhage.    Patient was within the time frame for tenecteplase. She had significant left sided deficits. BP was less than 180/105. Contraindications were reviewed with patient and ruled out. Consent, risk, benefits, alternatives discussion was held. Patient is not on any anticoagulants, no recent head trauma, no recent surgeries, no recent major injuries.  Tenecteplase was given at 22:53 by ED attending; neurology present at bedside. Delay in tenecteplase administration due to risk benefit discussion and need for patient consent as well as placement of venous access. Patient will be admitted to stroke service for monitoring, with hematology following for management of sickle cell.       pre mRS: 1  LKN: 2100 4/28/23  NIHSS:4      Impression: Left sided hemiparesis and hemisensory deficits, likely sensorimotor ischemic stroke, in the setting of hypercoagulability secondary to sickle cell and factor V leiden.    Recommendations:    After Tenecteplase administration at 2253.  > Neurochecks: Every 15 mins x two hours, then every 30 mins x6 hours, then every hour x 16 hours.  > No ron removal or placement for 24 hours  > No venous/arterial puncture at non-compressible site  > No anticoagulation or anti-platelet agents for 24 hours  > Cardene drip as needed to keep BP < 180/105  > Permissive HTN up to /105 for 48 hours then gradual normotension over 2-3 days.   > Intravenous hydration with normal saline at 75cc per hour  > Head of bed > 30 degrees for aspiration prevention and aspiration precautions ordered.  > Stroke education and counseling    [] Admit to stroke unit    [] CTA head and neck once premedication for allergy complete per protocol  [] Hematology consult in AM  [] Please repeat CTH in 24 hours after Tenecteplase administration.      [] MRI brain without contrast   [] TTE w/ bubble   [] Lipid panel, HbA1c  [] CBC, CMP, coagulation panel, troponin  [] Atorvastatin 80mg (titrate to LDL < 70)   [] DVT prophylaxis   [] Telemonitoring  [] Baseline EKG and CXR  [] Neurological checks and vital signs per unit protocol  [] BGM goals 140-180  [] NPO for now  [] PT/OT; S/S evaluation  [] Aspirin 81mg daily will be started 24h after Tenecteplase if repeat CTH negative for hemorrhage.     Tenecteplase accept consent: The risks and benefits of IV administration was discussed with patient/family in presence of ED staff. Risks including but not limited to intracranial hemorrhage, major systemic hemorrhage and angioedema. In addition, contraindications to Tenecteplase were reviewed with patient and confirmed to not be present, allowing for administration of Tenecteplase .     Patient was discussed with the stroke fellow Dr. Funez and under supervision with attending Dr. Dale, will be formally staffed in morning rounds. Recommendations finalized once signed by attending    * Case and plan not final until Attending attestation *

## 2023-04-29 NOTE — CONSULT NOTE ADULT - SUBJECTIVE AND OBJECTIVE BOX
HPI:  Chief Complaint: 33 yo F w sickle cell disease p/w Left sided weakness    HPI: 33 yo F presented to [facility] for left face numbness, left arm and leg drift since 9:30PM. LKN 9PM. She reports a Hx of Sickle cell disease, Factor V leiden, 5 DVT in R leg, Seizures, PTSD, depression, PE, Prior stroke with R side deficits (no residual), heart surgery 2015, aortic valve replacement, renal cell carcinoma on chemo, umbilical hernia repair, gallbladder removal, PFO. She states she is having blurry vision, headache 8/10, chest pain, pain in back, hips, joints, difficulty walking, feeling off balance, nausea. vomiting.     Patient reports allergy to CT contrast. She was initially refusing premedication protocol for CT imaging, so CTP and CTA delayed. She refused MRA due to claustrophobia despite being offered sedatives.  CT non con did not show hemorrhage.    Patient was within the time frame for tenecteplase. She had significant left sided deficits. BP was less than 180/105. Contraindications were reviewed with patient and ruled out. Consent, risk, benefits, alternatives discussion was held. Patient is not on any anticoagulants, no recent head trauma, no recent surgeries, no recent major injuries.  Tenecteplase was given at 22:53 by ED attending; neurology present at bedside. Delay in tenecteplase administration due to risk benefit discussion and need for patient consent as well as placement of venous access. Patient will be admitted to stroke service for monitoring, with hematology following for management of sickle cell.     Overnight, patient kept threatening to leave AMA. Stroke team at bedside multiple times overnight to discuss risks with patient. Patient also refused premedication for contrast allergy multiple times, delaying imaging, despite discussion of risks and benefits.     Extensive discussion with patient bedside, reports that her last pain crisis was 8 months ago requiring exchange transfusion and she was hospitalized at [facility] in [facility]. States that her usual pain medications are MS contin 30mg, hydromorphone 4mg 6 times a day as needed, as well as hydrea, and multiple psychiatric medications. She states that she was supposed to be on coumadin but she self-discontinued about 3 weeks ago. She reports she was diagnosed with renal cell carcinoma in  and is on chemo (last 10 days ago). When asked if she had a nephrectomy, she stated that her other kidney has "diminished function and is in her pelvis" which is why she never had one. Her port was placed in  prior to her RCC diagnosis but she states it was for exchange transfusions. She expressed frustration that her pain was not being adequately treated. Discussed with patient that we need to verify her medications which she was amenable with, however when [email or web address] pharmacy called for a med rec, there was no record of patient named Irvin ISSA 88 in their system. She stated she has phone numbers for her doctors in her other phone that her sister has access too. She reports her sister is an RN at [facility], however [facility] or on [employer] Teams. She currently has oral pain medications prescribed which she states she cannot take because of nausea, but we discussed taking nausea medication so that she can tolerate the PO meds until further collateral can be obtained.     Outpatient providers per patient:  Dr. Zack Maddox (Hematology)-cannot be found on [email or web address] website  Dr. Correa (Psychiatry)-[email or web address], not searchable   Dr. Ranjana Wu (Oncology)-[email or web address], not searchable      PMH: Sickle cell disease, Factor V leiden, 5 DVT in R leg, Seizures, PTSD, depression, PE, Prior stroke with R side deficits (no residual), heart surgery  w/ aortic valve replacement, umbilical hernia repair, gallbladder removal. PFO, RCC on chemo  Allergy: CT contrast. Haldol (anaphylaxis). zofran, reglan, protonix, toradol .   Meds: Hydromorphone 4mg (not on [facility] even when searched in [place]), hydroxyurea 500mg daily, benadryl 50mg PRN nausea, Seroquel 100mg TIS, Gabapentin 800mg TID, Flexeril 10mg TID PRN, Metoprolol 25mg daily, Lisinopril 10mg daily, MS contin 30 ER q8h, prazosin 4mg at bedtime, trazodone 200mg at bedtime, celexa 20mg BID, clonipin 2mg PRN anxiety, melatonin 10mg at bedtime.  Travel: from [place], [place]. States she receives her medical care at [email or web address] system in [place], though some specialists are at [facility].   Hospitalizations: none recent--last 8 months ago at [facility] per patient   Immunized for covid  Surgical Hx: heart surgery, cholecystectomy, hernia repair  FH: dad had DM, colon ca, heart attack. Mother had heart attacks, strokes, HTN, sickle cell disease  SH: marijuana, occasional alcohol. no smoking    Review of Systems:   CONSTITUTIONAL: No fever, weight loss, + fatigue  EYES: No eye pain, visual disturbances, or discharge  ENMT:  No difficulty hearing, tinnitus, vertigo; No sinus or throat pain  NECK: No pain or stiffness  BREASTS: No pain, masses, or nipple discharge  RESPIRATORY: No cough, wheezing, chills or hemoptysis; + shortness of breath  CARDIOVASCULAR: No chest pain, palpitations, dizziness, + leg swelling  GASTROINTESTINAL: No abdominal or epigastric pain. No nausea, vomiting, or hematemesis; No diarrhea or constipation. No melena or hematochezia.  GENITOURINARY: No dysuria, frequency, hematuria, or incontinence  NEUROLOGICAL: No headaches, memory loss, +loss of strength, numbness, or tremors  SKIN: No itching, burning, rashes, or lesions   LYMPH NODES: No enlarged glands  ENDOCRINE: No heat or cold intolerance; No hair loss  MUSCULOSKELETAL: No joint pain or swelling; No muscle, +back, + extremity pain  PSYCHIATRIC: +depression, +anxiety, mood swings, no difficulty sleeping  HEME/LYMPH: No easy bruising, or bleeding gums  ALLERY AND IMMUNOLOGIC: No hives or eczema    OBJECTIVE:   Vital Signs Last 24 Hrs  T(C): 36.4 (2023 08:00), Max: 37.1 (2023 01:00)  T(F): 97.5 (2023 08:00), Max: 98.8 (2023 01:00)  HR: 87 (2023 10:00) (75 - 95)  BP: 141/60 (2023 10:00) (125/98 - 181/113)  BP(mean): 111 (2023 09:00) (99 - 127)  RR: 13 (2023 10:00) (11 - 26)  SpO2: 98% (2023 10:00) (92% - 100%)    Parameters below as of 2023 10:00  Patient On (Oxygen Delivery Method): room air        I&O's Summary      PHYSICAL EXAM:  GEN: Obese female resting in bed, no acute distress.   PSYCH: A&Ox3, mood and affect appear upset  SKIN: intact, no e/o rash  NEURO: no focal neurologic deficits appreciated; unclear if inability to shrug shoulder on L side was from effort or true weakness. 5/5 strength in R side and LLE.   EYES: PERRL, anicteric, EOMI, no vertical/horizontal nystagmus  HEAD: NC, AT  NECK: supple  RESPI: no accessory muscle use, B/L air entry, CTAB   CARDIO: regular rate/rhythm, no LE edema B/L  ABD: soft, NT, ND, +BS  EXT: patient able to move all extremities spontaneously  VASC: peripheral pulses palpated    Labs:  CAPILLARY BLOOD GLUCOSE  117 (2023 00:07)      POCT Blood Glucose.: 117 mg/dL (2023 22:11)                          9.8    6.73  )-----------( 385      ( 2023 22:58 )             31.5     04-    140  |  103  |  10  ----------------------------<  99  3.6   |  28  |  0.71    Ca    8.8      2023 05:38    TPro  6.7  /  Alb  3.9  /  TBili  0.1<L>  /  DBili  x   /  AST  13  /  ALT  21  /  AlkPhos  95  04-28    PT/INR - ( 2023 05:40 )   PT: 12.3 sec;   INR: 1.07 ratio         PTT - ( 2023 05:40 )  PTT:27.0 sec    .        Imaging Personally Reviewed:    Consultant(s) Notes Reviewed:    Care Discussed with Consultants/Other Providers: Neurology    MEDICATIONS  (STANDING):  citalopram 20 milliGRAM(s) Oral <User Schedule>  gabapentin 800 milliGRAM(s) Oral three times a day  HYDROmorphone   Tablet 4 milliGRAM(s) Oral every 4 hours  hydroxyurea (Non - oncologic) 500 milliGRAM(s) Oral daily  lisinopril 10 milliGRAM(s) Oral daily  melatonin 5 milliGRAM(s) Oral at bedtime  metoprolol tartrate 25 milliGRAM(s) Oral daily  morphine ER Tablet 30 milliGRAM(s) Oral every 8 hours  prazosin 4 milliGRAM(s) Oral at bedtime  QUEtiapine 100 milliGRAM(s) Oral three times a day  traZODone 200 milliGRAM(s) Oral at bedtime    MEDICATIONS  (PRN):  clonazePAM  Tablet 2 milliGRAM(s) Oral <User Schedule> PRN anxiety/psuedoseizures  cyclobenzaprine 10 milliGRAM(s) Oral three times a day PRN Muscle Spasm  diphenhydrAMINE 50 milliGRAM(s) Oral every 4 hours PRN Rash and/or Itching
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
## 2023-04-29 NOTE — H&P ADULT - NSVTERISKASSESS_GEN_ALL_CORE FT
Medical Assessment Completed on: 29-Apr-2023 01:29
Alert and oriented to person, place and time/Patient baseline mental status/Awake
Mart-1 - Negative Histology Text: MART-1 staining demonstrates a normal density and pattern of melanocytes along the dermal-epidermal junction. The surgical margins are negative for tumor cells.

## 2023-04-29 NOTE — SPEECH LANGUAGE PATHOLOGY EVALUATION - H & P REVIEW
31 yo F presented to [facility] for left face numbness, left arm and leg drift since 9:30PM. LKN 9PM. She reports a Hx of Sickle cell disease, Factor V leiden, 5 DVT in R leg, Seizures, PTSD, depression, PE, Prior stroke with R side deficits (no residual), heart surgery 2015, umbilical hernia repair, gallbladder removal, PFO. She states she is having blurry vision, headache 8/10, chest pain, pain in back, hips, joints, difficulty walking, feeling off balance, nausea. vomiting. Pt reports allergy to CT contrast. She was initially refusing premedication protocol for CT imaging, so CTP and CTA delayed. She refused MRA due to claustrophobia despite being offered sedatives. CT non con did not show hemorrhage. Tenecteplase given at 22:53 by ED attending; neurology present at bedside. Delay in tenecteplase administration due to risk benefit discussion and need for pt consent as well as placement of venous access. Pt admitted to stroke service for monitoring, with hematology following for management of sickle cell. Overnight, kept threatening to leave AMA. Stroke team at bedside multiple times overnight to discuss risks with pt. Pt also refused premedication for contrast allergy multiple times, delaying imaging, despite discussion of risks and benefits. pre mRS: 1. LKN: 2100 4/28/23. NIHSS:4. Per Neuro: Impression: Left sided hemiparesis and hemisensory deficits, likely sensorimotor ischemic stroke, in the setting of hypercoagulability secondary to sickle cell and factor V leiden./yes

## 2023-04-29 NOTE — SWALLOW BEDSIDE ASSESSMENT ADULT - SLP GENERAL OBSERVATIONS
Pt seen at bedside, awake and alert, oriented, flat affect, verbally responsive, following directions for the purposes of the exam.

## 2023-04-29 NOTE — BH CONSULTATION LIAISON ASSESSMENT NOTE - ADDITIONAL PSYCHIATRIC MEDICATIONS
as in HPI:  1) quetiapine 100mg TID, 2) prazosin 4mg qhs, 3) citalopram 20mg BID, 4) Klonopin 2mg QID PRN, 5) melatonin 10mg qhs

## 2023-04-29 NOTE — H&P ADULT - NSHPREVIEWOFSYSTEMS_GEN_ALL_CORE
Review of Systems:  INCOMPLETE   CONSTITUTIONAL: No fevers or chills  EYES/ENT: No visual changes or no throat pain   NECK: No pain or stiffness  RESPIRATORY: No hemoptysis or shortness of breath  CARDIOVASCULAR: No chest pain or palpitations  GASTROINTESTINAL: No melena or hematochezia  GENITOURINARY: No dysuria or hematuria  NEUROLOGICAL: +As stated in HPI above  SKIN: No itching, burning, rashes, or lesions   All other review of systems is negative unless indicated above. left face numbness, left arm and leg drift, blurry vision, headache 8/10, chest pain, pain in back, hips, joints, difficulty walking, feeling off balance, nausea. vomiting.     Review of Systems:  INCOMPLETE   CONSTITUTIONAL: No fevers or chills  EYES/ENT: no throat pain   NECK: No pain or stiffness  RESPIRATORY: No hemoptysis or shortness of breath  CARDIOVASCULAR: no palpitations  GASTROINTESTINAL: No melena or hematochezia  GENITOURINARY: No dysuria or hematuria  NEUROLOGICAL: +As stated in HPI above  SKIN: No itching, burning, rashes, or lesions

## 2023-04-29 NOTE — BH CONSULTATION LIAISON ASSESSMENT NOTE - CURRENT MEDICATION
MEDICATIONS  (STANDING):  citalopram 20 milliGRAM(s) Oral <User Schedule>  diphenhydrAMINE Injectable 25 milliGRAM(s) IV Push once  diphenhydrAMINE Injectable 50 milliGRAM(s) IV Push once  gabapentin 800 milliGRAM(s) Oral three times a day  hydroxyurea (Non - oncologic) 500 milliGRAM(s) Oral daily  lisinopril 10 milliGRAM(s) Oral daily  melatonin 5 milliGRAM(s) Oral at bedtime  metoprolol tartrate 25 milliGRAM(s) Oral daily  morphine ER Tablet 30 milliGRAM(s) Oral every 8 hours  prazosin 4 milliGRAM(s) Oral at bedtime  QUEtiapine 100 milliGRAM(s) Oral three times a day  traZODone 200 milliGRAM(s) Oral at bedtime    MEDICATIONS  (PRN):  clonazePAM  Tablet 2 milliGRAM(s) Oral <User Schedule> PRN anxiety/psuedoseizures  cyclobenzaprine 10 milliGRAM(s) Oral three times a day PRN Muscle Spasm  diphenhydrAMINE Injectable 50 milliGRAM(s) IV Push every 4 hours PRN Rash and/or Itching  HYDROmorphone  Injectable 2 milliGRAM(s) IV Push every 6 hours PRN Severe Pain (7 - 10)

## 2023-04-29 NOTE — ED ADULT NURSE REASSESSMENT NOTE - NS ED NURSE REASSESS COMMENT FT1
pt is requesting to leave, states sister is on her way here to pick her up to take pt to different hospital. Pt was informed if pain medication is needed due to continued pain, MD will be made aware for an intervention. Informed MD Rudolph to speak to pt.
Neuro RN at bedside for Bedside report, Neuro assessment performed with receiving RN.
pt is refusing oral temp as part of vital assessment.
report received from KARINA Zimmerman. Pt is a&ox3 laying down restfully, respirations are even and nonlabored, pt is 86bpm NSR on cardiac monitor lead 2. Pt is aware of q15min vitals. No further RN interventions are needed at this time.

## 2023-04-29 NOTE — H&P ADULT - NSHPLABSRESULTS_GEN_ALL_CORE
Labs:                        9.8    6.73  )-----------( 385      ( 28 Apr 2023 22:58 )             31.5     04-28    141  |  102  |  10  ----------------------------<  119<H>  3.2<L>   |  28  |  0.78    Ca    8.8      28 Apr 2023 22:58    TPro  6.7  /  Alb  3.9  /  TBili  0.1<L>  /  DBili  x   /  AST  13  /  ALT  21  /  AlkPhos  95  04-28    CAPILLARY BLOOD GLUCOSE  117 (29 Apr 2023 00:07)      POCT Blood Glucose.: 117 mg/dL (28 Apr 2023 22:11)    LIVER FUNCTIONS - ( 28 Apr 2023 22:58 )  Alb: 3.9 g/dL / Pro: 6.7 g/dL / ALK PHOS: 95 U/L / ALT: 21 U/L / AST: 13 U/L / GGT: x             PT/INR - ( 28 Apr 2023 22:58 )   PT: 12.7 sec;   INR: 1.10 ratio         PTT - ( 28 Apr 2023 22:58 )  PTT:28.7 sec  CSF:

## 2023-04-29 NOTE — CONSULT NOTE ADULT - PROBLEM SELECTOR RECOMMENDATION 2
Unclear if patient has sickle, as her Hgb is roughly 10 which is not consistent with SCD in crisis, retic count normal, Tbili normal all going against acute crisis. Patient reports her hematologist is Dr. Zack Maddox at [facility] in [place], [place] however cannot be found on their website and patient does not have contact information for this MD.   -f/u LDH, hapto, and hemoglobin electrophoresis   -If electrophoresis is negative, would stop hydrea   -would continue current pain medication regimen (no IV pain meds) while electrophoresis is pending; per discussion with patient, her issue is more nausea than pain which is why she does not want PO pain meds. Explained we can try anti-nausea medications with PO pain meds for now.   -I-stop checked in [place] and [place] with no results, similarly pt's self-reported pharmacy does not have records of her filling from them  -would keep narcan bedside and monitor for oversedation  -Please try to obtain collateral information if possible for her other providers; unable to find any MDs, unable to reach family, unable to verify patient's ID (Irvin Reed  [id number])  -added on iron studies, please follow up to determine cause for pt's anemia

## 2023-04-29 NOTE — BH CONSULTATION LIAISON ASSESSMENT NOTE - SUMMARY
This is a 31y/o woman with a past medical history of sickle cell disease, Factor V Leiden, multiple prior DVTs, renal cell carcinoma (on chemo), and reported seizures, and a past reported psychiatric history of bipolar disorder, schizophrenia, PTSD, one prior suicide attempt (2009 via wrist cutting), and multiple prior hospitalizations (last 2017 in [place]) presenting for stroke (treated with tenecteplase).     On exam, patient does not have capacity to leave AMA: while she is able to express understanding of her condition, she is unable to appreciate the consequences of lack of follow up/treatment, nor can she provide a logical rationale for her choice to leave, which was inconsistent (at times wishing to stay and be treated further, at others wanting to leave immediately).     While the patient may have underlying cluster B personality traits secondary to chronic pain and trauma, she is not currently psychotic or manic. She denies SIIP and HIIP. Given this, she does not warrant inpatient psychiatric hospitalization.     Plan:   - restart home psychiatric medications: 1) quetiapine 100mg TID, 2) prazosin 4mg qhs, 3) citalopram 20mg BID, 4) melatonin 10mg qhs  - modify home clonazepam to 0.5mg TID PRN  - consider involvement of heme/onc and pain management for sickle cell disease management  - consider involvement of social work for transport, housing, and legal considerations

## 2023-04-29 NOTE — STROKE CODE NOTE - NSSTROKETPADOOR_OTHER_FT
delay in giving tenectaplase due to difficulty obtaining port access, pt initially resistant to IV placement but eventually allowed after explanation of time-sensitive nature and failed first attempt for port access, u/s guided IV placed with good blood return, tenectaplase administered,

## 2023-04-30 VITALS
RESPIRATION RATE: 18 BRPM | SYSTOLIC BLOOD PRESSURE: 116 MMHG | TEMPERATURE: 98 F | OXYGEN SATURATION: 96 % | DIASTOLIC BLOOD PRESSURE: 78 MMHG | HEART RATE: 94 BPM

## 2023-04-30 LAB
ANION GAP SERPL CALC-SCNC: 12 MMOL/L — SIGNIFICANT CHANGE UP (ref 5–17)
BUN SERPL-MCNC: 10 MG/DL — SIGNIFICANT CHANGE UP (ref 7–23)
CALCIUM SERPL-MCNC: 9.1 MG/DL — SIGNIFICANT CHANGE UP (ref 8.4–10.5)
CHLORIDE SERPL-SCNC: 102 MMOL/L — SIGNIFICANT CHANGE UP (ref 96–108)
CO2 SERPL-SCNC: 27 MMOL/L — SIGNIFICANT CHANGE UP (ref 22–31)
CREAT SERPL-MCNC: 0.7 MG/DL — SIGNIFICANT CHANGE UP (ref 0.5–1.3)
EGFR: 117 ML/MIN/1.73M2 — SIGNIFICANT CHANGE UP
GLUCOSE SERPL-MCNC: 104 MG/DL — HIGH (ref 70–99)
HAPTOGLOB SERPL-MCNC: 206 MG/DL — HIGH (ref 34–200)
HCT VFR BLD CALC: 32 % — LOW (ref 34.5–45)
HGB BLD-MCNC: 9.5 G/DL — LOW (ref 11.5–15.5)
MCHC RBC-ENTMCNC: 24.1 PG — LOW (ref 27–34)
MCHC RBC-ENTMCNC: 29.7 GM/DL — LOW (ref 32–36)
MCV RBC AUTO: 81.2 FL — SIGNIFICANT CHANGE UP (ref 80–100)
NRBC # BLD: 0 /100 WBCS — SIGNIFICANT CHANGE UP (ref 0–0)
PLATELET # BLD AUTO: 373 K/UL — SIGNIFICANT CHANGE UP (ref 150–400)
POTASSIUM SERPL-MCNC: 3.4 MMOL/L — LOW (ref 3.5–5.3)
POTASSIUM SERPL-SCNC: 3.4 MMOL/L — LOW (ref 3.5–5.3)
RBC # BLD: 3.94 M/UL — SIGNIFICANT CHANGE UP (ref 3.8–5.2)
RBC # FLD: 17.6 % — HIGH (ref 10.3–14.5)
SODIUM SERPL-SCNC: 141 MMOL/L — SIGNIFICANT CHANGE UP (ref 135–145)
WBC # BLD: 6.93 K/UL — SIGNIFICANT CHANGE UP (ref 3.8–10.5)
WBC # FLD AUTO: 6.93 K/UL — SIGNIFICANT CHANGE UP (ref 3.8–10.5)

## 2023-04-30 PROCEDURE — 80061 LIPID PANEL: CPT

## 2023-04-30 PROCEDURE — 85045 AUTOMATED RETICULOCYTE COUNT: CPT

## 2023-04-30 PROCEDURE — 82962 GLUCOSE BLOOD TEST: CPT

## 2023-04-30 PROCEDURE — 85014 HEMATOCRIT: CPT

## 2023-04-30 PROCEDURE — 85610 PROTHROMBIN TIME: CPT

## 2023-04-30 PROCEDURE — 71045 X-RAY EXAM CHEST 1 VIEW: CPT

## 2023-04-30 PROCEDURE — 99223 1ST HOSP IP/OBS HIGH 75: CPT | Mod: GC

## 2023-04-30 PROCEDURE — 99285 EMERGENCY DEPT VISIT HI MDM: CPT | Mod: 25

## 2023-04-30 PROCEDURE — 0042T: CPT | Mod: MA

## 2023-04-30 PROCEDURE — 83020 HEMOGLOBIN ELECTROPHORESIS: CPT

## 2023-04-30 PROCEDURE — 83615 LACTATE (LD) (LDH) ENZYME: CPT

## 2023-04-30 PROCEDURE — 80053 COMPREHEN METABOLIC PANEL: CPT

## 2023-04-30 PROCEDURE — 85660 RBC SICKLE CELL TEST: CPT

## 2023-04-30 PROCEDURE — 82947 ASSAY GLUCOSE BLOOD QUANT: CPT

## 2023-04-30 PROCEDURE — 96375 TX/PRO/DX INJ NEW DRUG ADDON: CPT

## 2023-04-30 PROCEDURE — 84484 ASSAY OF TROPONIN QUANT: CPT

## 2023-04-30 PROCEDURE — 84295 ASSAY OF SERUM SODIUM: CPT

## 2023-04-30 PROCEDURE — 83010 ASSAY OF HAPTOGLOBIN QUANT: CPT

## 2023-04-30 PROCEDURE — 84132 ASSAY OF SERUM POTASSIUM: CPT

## 2023-04-30 PROCEDURE — 36415 COLL VENOUS BLD VENIPUNCTURE: CPT

## 2023-04-30 PROCEDURE — 82330 ASSAY OF CALCIUM: CPT

## 2023-04-30 PROCEDURE — 82565 ASSAY OF CREATININE: CPT

## 2023-04-30 PROCEDURE — 82435 ASSAY OF BLOOD CHLORIDE: CPT

## 2023-04-30 PROCEDURE — 70496 CT ANGIOGRAPHY HEAD: CPT | Mod: MA

## 2023-04-30 PROCEDURE — 99231 SBSQ HOSP IP/OBS SF/LOW 25: CPT

## 2023-04-30 PROCEDURE — 83605 ASSAY OF LACTIC ACID: CPT

## 2023-04-30 PROCEDURE — 85018 HEMOGLOBIN: CPT

## 2023-04-30 PROCEDURE — 85730 THROMBOPLASTIN TIME PARTIAL: CPT

## 2023-04-30 PROCEDURE — 87637 SARSCOV2&INF A&B&RSV AMP PRB: CPT

## 2023-04-30 PROCEDURE — 80048 BASIC METABOLIC PNL TOTAL CA: CPT

## 2023-04-30 PROCEDURE — 85025 COMPLETE CBC W/AUTO DIFF WBC: CPT

## 2023-04-30 PROCEDURE — 70498 CT ANGIOGRAPHY NECK: CPT | Mod: MA

## 2023-04-30 PROCEDURE — 70450 CT HEAD/BRAIN W/O DYE: CPT | Mod: MA

## 2023-04-30 PROCEDURE — 96374 THER/PROPH/DIAG INJ IV PUSH: CPT

## 2023-04-30 PROCEDURE — 85027 COMPLETE CBC AUTOMATED: CPT

## 2023-04-30 PROCEDURE — 82803 BLOOD GASES ANY COMBINATION: CPT

## 2023-04-30 RX ORDER — GABAPENTIN 400 MG/1
1 CAPSULE ORAL
Refills: 0 | DISCHARGE
Start: 2023-04-30 | End: 2023-05-06

## 2023-04-30 RX ORDER — TRAZODONE HCL 50 MG
2 TABLET ORAL
Refills: 0 | DISCHARGE
Start: 2023-04-30 | End: 2023-05-06

## 2023-04-30 RX ORDER — METOPROLOL TARTRATE 50 MG
1 TABLET ORAL
Qty: 0 | Refills: 0 | DISCHARGE
Start: 2023-04-30

## 2023-04-30 RX ORDER — CLONAZEPAM 1 MG
1 TABLET ORAL
Qty: 7 | Refills: 0
Start: 2023-04-30

## 2023-04-30 RX ORDER — LISINOPRIL 2.5 MG/1
1 TABLET ORAL
Qty: 0 | Refills: 0 | DISCHARGE
Start: 2023-04-30

## 2023-04-30 RX ORDER — TRAZODONE HCL 50 MG
2 TABLET ORAL
Qty: 14 | Refills: 0
Start: 2023-04-30

## 2023-04-30 RX ORDER — CLONAZEPAM 1 MG
1 TABLET ORAL
Qty: 0 | Refills: 0 | DISCHARGE
Start: 2023-04-30 | End: 2023-05-06

## 2023-04-30 RX ORDER — QUETIAPINE FUMARATE 200 MG/1
1 TABLET, FILM COATED ORAL
Qty: 21 | Refills: 0
Start: 2023-04-30

## 2023-04-30 RX ORDER — MORPHINE SULFATE 50 MG/1
30 CAPSULE, EXTENDED RELEASE ORAL EVERY 8 HOURS
Refills: 0 | Status: DISCONTINUED | OUTPATIENT
Start: 2023-04-30 | End: 2023-04-30

## 2023-04-30 RX ORDER — DIPHENHYDRAMINE HCL 50 MG
12.5 CAPSULE ORAL ONCE
Refills: 0 | Status: COMPLETED | OUTPATIENT
Start: 2023-04-30 | End: 2023-04-30

## 2023-04-30 RX ORDER — GABAPENTIN 400 MG/1
1 CAPSULE ORAL
Qty: 21 | Refills: 0
Start: 2023-04-30

## 2023-04-30 RX ORDER — QUETIAPINE FUMARATE 200 MG/1
1 TABLET, FILM COATED ORAL
Refills: 0 | DISCHARGE
Start: 2023-04-30 | End: 2023-05-06

## 2023-04-30 RX ADMIN — Medication 2 MILLIGRAM(S): at 03:45

## 2023-04-30 RX ADMIN — Medication 2 MILLIGRAM(S): at 16:41

## 2023-04-30 RX ADMIN — CITALOPRAM 20 MILLIGRAM(S): 10 TABLET, FILM COATED ORAL at 09:48

## 2023-04-30 RX ADMIN — HYDROXYUREA 500 MILLIGRAM(S): 500 CAPSULE ORAL at 16:40

## 2023-04-30 RX ADMIN — HYDROMORPHONE HYDROCHLORIDE 4 MILLIGRAM(S): 2 INJECTION INTRAMUSCULAR; INTRAVENOUS; SUBCUTANEOUS at 09:49

## 2023-04-30 RX ADMIN — HYDROMORPHONE HYDROCHLORIDE 4 MILLIGRAM(S): 2 INJECTION INTRAMUSCULAR; INTRAVENOUS; SUBCUTANEOUS at 16:52

## 2023-04-30 RX ADMIN — MORPHINE SULFATE 30 MILLIGRAM(S): 50 CAPSULE, EXTENDED RELEASE ORAL at 16:40

## 2023-04-30 RX ADMIN — HYDROMORPHONE HYDROCHLORIDE 4 MILLIGRAM(S): 2 INJECTION INTRAMUSCULAR; INTRAVENOUS; SUBCUTANEOUS at 03:57

## 2023-04-30 RX ADMIN — GABAPENTIN 800 MILLIGRAM(S): 400 CAPSULE ORAL at 16:40

## 2023-04-30 RX ADMIN — Medication 12.5 MILLIGRAM(S): at 09:49

## 2023-04-30 RX ADMIN — Medication 2 MILLIGRAM(S): at 09:48

## 2023-04-30 RX ADMIN — QUETIAPINE FUMARATE 100 MILLIGRAM(S): 200 TABLET, FILM COATED ORAL at 16:40

## 2023-04-30 RX ADMIN — MORPHINE SULFATE 30 MILLIGRAM(S): 50 CAPSULE, EXTENDED RELEASE ORAL at 09:48

## 2023-04-30 RX ADMIN — GABAPENTIN 800 MILLIGRAM(S): 400 CAPSULE ORAL at 09:48

## 2023-04-30 RX ADMIN — Medication 12.5 MILLIGRAM(S): at 16:39

## 2023-04-30 RX ADMIN — MORPHINE SULFATE 30 MILLIGRAM(S): 50 CAPSULE, EXTENDED RELEASE ORAL at 01:02

## 2023-04-30 RX ADMIN — QUETIAPINE FUMARATE 100 MILLIGRAM(S): 200 TABLET, FILM COATED ORAL at 09:49

## 2023-04-30 RX ADMIN — HYDROMORPHONE HYDROCHLORIDE 4 MILLIGRAM(S): 2 INJECTION INTRAMUSCULAR; INTRAVENOUS; SUBCUTANEOUS at 10:20

## 2023-04-30 NOTE — BH CONSULTATION LIAISON PROGRESS NOTE - CURRENT MEDICATION
MEDICATIONS  (STANDING):  citalopram 20 milliGRAM(s) Oral <User Schedule>  gabapentin 800 milliGRAM(s) Oral three times a day  HYDROmorphone   Tablet 4 milliGRAM(s) Oral every 4 hours  hydroxyurea (Non - oncologic) 500 milliGRAM(s) Oral daily  lisinopril 10 milliGRAM(s) Oral daily  melatonin 5 milliGRAM(s) Oral at bedtime  metoprolol tartrate 25 milliGRAM(s) Oral daily  morphine ER Tablet 30 milliGRAM(s) Oral every 8 hours  prazosin 4 milliGRAM(s) Oral at bedtime  QUEtiapine 100 milliGRAM(s) Oral three times a day  traZODone 200 milliGRAM(s) Oral at bedtime    MEDICATIONS  (PRN):  clonazePAM  Tablet 2 milliGRAM(s) Oral <User Schedule> PRN anxiety/psuedoseizures  cyclobenzaprine 10 milliGRAM(s) Oral three times a day PRN Muscle Spasm  diphenhydrAMINE 50 milliGRAM(s) Oral every 4 hours PRN Rash and/or Itching
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
## 2023-04-30 NOTE — DISCHARGE NOTE PROVIDER - NSFOLLOWUPCLINICS_GEN_ALL_ED_FT
[facility]  Hematology/Oncology  [address] [place] [address]  Phone: [phone]  Fax:   Follow Up Time: 1 week    [facility]  Neurology  [address] [place] [id number]  Phone: [phone]  Fax:     [facility]  Psychiatry  [address] [place] [address]  Phone: [phone]  Fax:

## 2023-04-30 NOTE — BH CONSULTATION LIAISON PROGRESS NOTE - NSBHASSESSMENTFT_PSY_ALL_CORE
This is a 31y/o woman with a past medical history of sickle cell disease, Factor V Leiden, multiple prior DVTs, renal cell carcinoma (on chemo), and reported seizures, and a past reported psychiatric history of bipolar disorder, schizophrenia, PTSD, one prior suicide attempt (2009 via wrist cutting), and multiple prior hospitalizations (last 2017 in [place]) presenting for stroke (treated with tenecteplase).     On exam, patient does not have capacity to leave AMA: while she is able to express understanding of her condition, she is unable to appreciate the consequences of lack of follow up/treatment, nor can she provide a logical rationale for her choice to leave, which was inconsistent (at times wishing to stay and be treated further, at others wanting to leave immediately).     While the patient may have underlying cluster B personality traits secondary to chronic pain and trauma, she is not currently psychotic or manic. She denies SIIP and HIIP. Given this, she does not warrant inpatient psychiatric hospitalization.

## 2023-04-30 NOTE — DISCHARGE NOTE PROVIDER - NSDCCPCAREPLAN_GEN_ALL_CORE_FT
PRINCIPAL DISCHARGE DIAGNOSIS  Diagnosis: Left-sided weakness  Assessment and Plan of Treatment: Your presented with left sided weakness which has resolved. No evidence of acute stroke or hemorrhage on CT head, No vessel disease on CTA head and neck. tenecteplase was administered. You refused repeat CT head 24 hours after tenecteplase and you also refused MRI brain. You verbalized understanding the risk of refusing repeat CT head and MRI. Since your presenting symptoms were resolved, low suspicion for stroke. You neurological exam is stable, no signs of change in mental status. Please follow up with neurology outpatient. Please follow up with your own hematologist, pain management specialist and your psychiatrist.      SECONDARY DISCHARGE DIAGNOSES  Diagnosis: Sickle cell pain crisis  Assessment and Plan of Treatment:

## 2023-04-30 NOTE — PROGRESS NOTE ADULT - ASSESSMENT
ASSESSMENT:  33 yo F presented to [facility] for left face numbness, left arm and leg drift since 9:30PM. LKN 9PM. She reports a Hx of Sickle cell disease, Factor V leiden, 5 DVT in R leg, Seizures, PTSD, depression, PE, Prior stroke with R side deficits (no residual), heart surgery 2015, umbilical hernia repair, gallbladder removal, PFO. She states she is having blurry vision, headache 8/10, chest pain, pain in back, hips, joints, difficulty walking, feeling off balance, nausea. vomiting.   Patient reports allergy to CT contrast. She was initially refusing premedication protocol for CT imaging, so CTP and CTA delayed. She refused MRA due to claustrophobia despite being offered sedatives. CT non con did not show hemorrhage.  Tenecteplase was administered 4/29 2253. Patient's neurological exam improved on 4/29 with NIHSS 0. CTA Head and NECK unrevealing. patient refused repeat CT head 24 hours post tenecteplase ( given 4/28 2253). Multiple attempts to leave AMA, pt verbalized understanding risks of leaving AMA. psychiatry saw patient and states patient has no capacity.      NEURO: Continue close monitoring for neurologic deterioration, permissive HTN, titrate statin to LDL goal less than 70, MRI Brain w/o, MRA Head w/o and Neck w/contrast. Physical therapy/OT/Speech eval/treatment.     ANTITHROMBOTIC THERAPY: unable to start aspirin 81mg since patient refused repeat CT head 24 hours post tenecteplase ( given 4/28 2253)     PULMONARY: CXR clear, protecting airway, saturating well     CARDIOVASCULAR: check TTE, cardiac monitoring                              SBP goal: normotension    GASTROINTESTINAL:  passed dysphagia screen       Diet: regular    RENAL: BUN/Cr stable, good urine output      Na Goal: Greater than 135     Jerez: none    HEMATOLOGY: H/H stable, Platelets normal 373     DVT ppx: Heparin s.c [] LMWH [] mechanical SCD [X]     ID: afebrile, no leukocytosis, chest xray clear lungs    OTHER:     DISPOSITION: Rehab or home depending on PT eval once stable and workup is complete      CORE MEASURES:        Admission NIHSS: 4     Tenecteplase: [x] YES [] NO      LDL/HDL: 116/42     Depression Screen: yes     Statin Therapy: atorvastatin     Dysphagia Screen: [x] PASS [] FAIL     Smoking [] YES [x] NO      Afib [] YES [x] NO     Stroke Education [x] YES [] NO    Obtain screening lower extremity venous ultrasound in patients who meet 1 or more of the following criteria as patient is high risk for DVT/PE on admission:   [] History of DVT/PE  []Hypercoagulable states (Factor V Leiden, Cancer, OCP, etc. )  []Prolonged immobility (hemiplegia/hemiparesis/post operative or any other extended immobilization)  [] Transferred from outside facility (Rehab or Long term care)  [] Age </= to 50 ASSESSMENT:  33 yo F presented to [facility] for left face numbness, left arm and leg drift since 9:30PM. LKN 9PM. She reports a Hx of Sickle cell disease, Factor V leiden, 5 DVT in R leg, Seizures, PTSD, depression, PE, Prior stroke with R side deficits (no residual), heart surgery 2015, umbilical hernia repair, gallbladder removal, PFO. She states she is having blurry vision, headache 8/10, chest pain, pain in back, hips, joints, difficulty walking, feeling off balance, nausea. vomiting.   Patient reports allergy to CT contrast. She was initially refusing premedication protocol for CT imaging, so CTP and CTA delayed. She refused MRA due to claustrophobia despite being offered sedatives. CT non con did not show hemorrhage.  Tenecteplase was administered 4/29 2253. Patient's neurological exam improved on 4/29 with NIHSS 0. CTA Head and NECK unrevealing. patient refused repeat CT head 24 hours post tenecteplase ( given 4/28 2253). Multiple attempts to leave AMA, pt verbalized understanding risks of leaving AMA. psychiatry saw patient and states patient has no capacity to leave AMA. Internal medicine consulted for pain management and evaluation of sickle cell crisis, no evidence of sickle cell crisis. Checked on ISTOP, no record of narcotic prescription for patient. No record of patient in the pharmacy that patient reports she receives medication from.  4/30: Patient's neurological exam is intact, no neurological deficit change of mental status or headache. Low suspicion for stroke given resolved symptoms. Patient continues to refuse repeat CT head and MRI brain    NEURO: Continue close monitoring for neurologic deterioration, permissive HTN, titrate statin to LDL goal less than 70, Patient refused MRI Brain w/o, patient education provided on reason/benefit of MRI and Repeat CT head, patient verbalized understanding and refused. Physical therapy/OT/Speech eval/treatment.     ANTITHROMBOTIC THERAPY: unable to start aspirin 81mg since patient refused repeat CT head 24 hours post tenecteplase ( given 4/28 2253)     PULMONARY: CXR clear, protecting airway, saturating well     CARDIOVASCULAR: check TTE, cardiac monitoring                              SBP goal: normotension    GASTROINTESTINAL:  passed dysphagia screen       Diet: regular    RENAL: BUN/Cr stable, good urine output      Na Goal: Greater than 135     Jerez: none    HEMATOLOGY: H/H stable, Platelets normal 373. Hematology consult appreciated     DVT ppx: Heparin s.c [] LMWH [] mechanical SCD [X]     ID: afebrile, no leukocytosis, chest xray clear lungs    OTHER: internal medicine consult appreciated for pain management and sickle cell history management. Dr. Jane documented that it is unclear if patient has sickle, as her Hgb is roughly 10 which is not consistent with SCD in crisis, retic count normal, Tbili normal all going against acute crisis. Patient reports her hematologist is Dr. Zack Maddox at [facility] in [place], [place] however cannot be found on their website and patient does not have contact information for this MD. Dr. Jane checked I-stop in [place] and [place] with no results, similarly pt's self-reported pharmacy does not have records of her filling from them.      DISPOSITION: D/C to home       CORE MEASURES:        Admission NIHSS: 4     Tenecteplase: [x] YES [] NO      LDL/HDL: 116/42     Depression Screen: yes     Statin Therapy: atorvastatin     Dysphagia Screen: [x] PASS [] FAIL     Smoking [] YES [x] NO      Afib [] YES [x] NO     Stroke Education [x] YES [] NO    Obtain screening lower extremity venous ultrasound in patients who meet 1 or more of the following criteria as patient is high risk for DVT/PE on admission:   [] History of DVT/PE  []Hypercoagulable states (Factor V Leiden, Cancer, OCP, etc. )  []Prolonged immobility (hemiplegia/hemiparesis/post operative or any other extended immobilization)  [] Transferred from outside facility (Rehab or Long term care)  [] Age </= to 50 ASSESSMENT:  33 yo F presented to [facility] for left face numbness, left arm and leg drift since 9:30PM. LKN 9PM. She reports a Hx of Sickle cell disease, Factor V leiden, 5 DVT in R leg, Seizures, PTSD, depression, PE, Prior stroke with R side deficits (no residual), heart surgery 2015, umbilical hernia repair, gallbladder removal, PFO. She states she is having blurry vision, headache 8/10, chest pain, pain in back, hips, joints, difficulty walking, feeling off balance, nausea. vomiting.   Patient reports allergy to CT contrast. She was initially refusing premedication protocol for CT imaging, so CTP and CTA delayed. She refused MRA due to claustrophobia despite being offered sedatives. CT non con did not show hemorrhage.  Tenecteplase was administered 4/29 2253. Patient's neurological exam improved on 4/29 with NIHSS 0. CTA Head and NECK unrevealing. patient refused repeat CT head 24 hours post tenecteplase ( given 4/28 2253). Multiple attempts to leave AMA, pt verbalized understanding risks of leaving AMA. psychiatry saw patient and states patient has no capacity to leave AMA. Internal medicine consulted for pain management and evaluation of sickle cell crisis, no evidence of sickle cell crisis. Checked on ISTOP, no record of narcotic prescription for patient. No record of patient in the pharmacy that patient reports she receives medication from.  4/30: Patient's neurological exam is intact, no neurological deficit change of mental status or headache. VS stable. Low suspicion for stroke given resolved symptoms. Patient continues to refuse repeat CT head and MRI brain.       NEURO: Continue close monitoring for neurologic deterioration, permissive HTN, titrate statin to LDL goal less than 70, Patient refused MRI Brain w/o, patient education provided on reason/benefit of MRI and Repeat CT head, patient verbalized understanding and refused. Physical therapy/OT/Speech eval/treatment.     ANTITHROMBOTIC THERAPY: unable to start aspirin 81mg since patient refused repeat CT head 24 hours post tenecteplase ( given 4/28 2253)     PULMONARY: CXR clear, protecting airway, saturating well     CARDIOVASCULAR: check TTE, cardiac monitoring                              SBP goal: normotension    GASTROINTESTINAL:  passed dysphagia screen       Diet: regular    RENAL: BUN/Cr stable, good urine output      Na Goal: Greater than 135     Jerez: none    HEMATOLOGY: H/H stable, Platelets normal 373. Hematology consult appreciated     DVT ppx: Heparin s.c [] LMWH [] mechanical SCD [X]     ID: afebrile, no leukocytosis, chest xray clear lungs    OTHER: internal medicine consult appreciated for pain management and sickle cell history management. Dr. Jane documented that it is unclear if patient has sickle, as her Hgb is roughly 10 which is not consistent with SCD in crisis, retic count normal, Tbili normal all going against acute crisis. Patient reports her hematologist is Dr. Zack Maddox at [facility] in [place], [place] however cannot be found on their website and patient does not have contact information for this MD. Dr. Jane checked I-stop in [place] and [place] with no results, similarly pt's self-reported pharmacy does not have records of her filling from them.      DISPOSITION: D/C to home       CORE MEASURES:        Admission NIHSS: 4     Tenecteplase: [x] YES [] NO      LDL/HDL: 116/42     Depression Screen: yes     Statin Therapy: atorvastatin     Dysphagia Screen: [x] PASS [] FAIL     Smoking [] YES [x] NO      Afib [] YES [x] NO     Stroke Education [x] YES [] NO    Obtain screening lower extremity venous ultrasound in patients who meet 1 or more of the following criteria as patient is high risk for DVT/PE on admission:   [] History of DVT/PE  []Hypercoagulable states (Factor V Leiden, Cancer, OCP, etc. )  []Prolonged immobility (hemiplegia/hemiparesis/post operative or any other extended immobilization)  [] Transferred from outside facility (Rehab or Long term care)  [] Age </= to 50 ASSESSMENT:  33 yo F presented to [facility] for left face numbness, left arm and leg drift since 9:30PM. LKN 9PM. She reports a Hx of Sickle cell disease, Factor V leiden, 5 DVT in R leg, Seizures, PTSD, depression, PE, Prior stroke with R side deficits (no residual), heart surgery 2015, umbilical hernia repair, gallbladder removal, PFO. She states she is having blurry vision, headache 8/10, chest pain, pain in back, hips, joints, difficulty walking, feeling off balance, nausea. vomiting.   Patient reports allergy to CT contrast. She was initially refusing premedication protocol for CT imaging, so CTP and CTA delayed. She refused MRA due to claustrophobia despite being offered sedatives. CT non con did not show hemorrhage.  Tenecteplase was administered 4/29 2253. Patient's neurological exam improved on 4/29 with NIHSS 0. CTA Head and NECK unrevealing. patient refused repeat CT head 24 hours post tenecteplase ( given 4/28 2253). Multiple attempts to leave AMA, pt verbalized understanding risks of leaving AMA. psychiatry saw patient and states patient has no capacity to leave AMA. Internal medicine consulted for pain management and evaluation of sickle cell crisis, no evidence of sickle cell crisis. Checked on ISTOP, no record of narcotic prescription for patient. No record of patient in the pharmacy that patient reports she receives medication from.    4/30: Patient's neurological exam is intact, AOX3, follows all commands. no neurological deficit change of mental status or headache. VS stable. Low suspicion for stroke given resolved symptoms. Patient continues to refuse repeat CT head and MRI brain, also refused CT chest/abdomen and pelvis. Patient education on importance of imaging studies/workup provided to patient. Patient verbalized understanding of risks of refusing further imaging and treatments.       NEURO: Continue close monitoring for neurologic deterioration, permissive HTN, titrate statin to LDL goal less than 70, Patient refused MRI Brain w/o, patient education provided on reason/benefit of MRI and Repeat CT head, patient verbalized understanding and refused. Physical therapy/OT/Speech eval/treatment.     ANTITHROMBOTIC THERAPY: unable to start aspirin 81mg since patient refused repeat CT head 24 hours post tenecteplase ( given 4/28 2253)     PULMONARY: CXR clear, protecting airway, saturating well     CARDIOVASCULAR: check TTE, cardiac monitoring                              SBP goal: normotension    GASTROINTESTINAL:  passed dysphagia screen       Diet: regular    RENAL: BUN/Cr stable, good urine output      Na Goal: Greater than 135     Jerez: none    HEMATOLOGY: H/H stable, Platelets normal 373. Hematologist evaluated patient, recommended CT chest/abdomen and pelvis, patient refused, wanted to go home. Patient can follow up outpatient with hematology.     DVT ppx: Heparin s.c [] LMWH [] mechanical SCD [X]     ID: afebrile, no leukocytosis, chest xray clear lungs    OTHER: internal medicine consult appreciated for pain management and sickle cell history management. Dr. Jane documented that it is unclear if patient has sickle, as her Hgb is roughly 10 which is not consistent with SCD in crisis, retic count normal, Tbili normal all going against acute crisis. Patient reports her hematologist is Dr. Zack Maddox at [facility] in [place], [place] however cannot be found on their website and patient does not have contact information for this MD. Dr. Jane checked I-stop in [place] and [place] with no results, similarly pt's self-reported pharmacy does not have records of her filling from them.      DISPOSITION: D/C to home       CORE MEASURES:        Admission NIHSS: 4     Tenecteplase: [x] YES [] NO      LDL/HDL: 116/42     Depression Screen: yes     Statin Therapy: atorvastatin     Dysphagia Screen: [x] PASS [] FAIL     Smoking [] YES [x] NO      Afib [] YES [x] NO     Stroke Education [x] YES [] NO    Obtain screening lower extremity venous ultrasound in patients who meet 1 or more of the following criteria as patient is high risk for DVT/PE on admission:   [] History of DVT/PE  []Hypercoagulable states (Factor V Leiden, Cancer, OCP, etc. )  []Prolonged immobility (hemiplegia/hemiparesis/post operative or any other extended immobilization)  [] Transferred from outside facility (Rehab or Long term care)  [] Age </= to 50 ASSESSMENT:  31 yo F presented to [facility] for left face numbness, left arm and leg drift since 9:30PM. LKN 9PM. She reports a Hx of Sickle cell disease, Factor V leiden, 5 DVT in R leg, Seizures, PTSD, depression, PE, Prior stroke with R side deficits (no residual), heart surgery 2015, umbilical hernia repair, gallbladder removal, PFO. She states she is having blurry vision, headache 8/10, chest pain, pain in back, hips, joints, difficulty walking, feeling off balance, nausea. vomiting.   Patient reports allergy to CT contrast. She was initially refusing premedication protocol for CT imaging, so CTP and CTA delayed. She refused MRA due to claustrophobia despite being offered sedatives. CT non con did not show hemorrhage.  Tenecteplase was administered 4/29 2253. Patient's neurological exam improved on 4/29 with NIHSS 0. CTA Head and NECK unrevealing. patient refused repeat CT head 24 hours post tenecteplase ( given 4/28 2253). Multiple attempts to leave AMA, pt verbalized understanding risks of leaving AMA. psychiatry saw patient and states patient has no capacity to leave AMA. Internal medicine consulted for pain management and evaluation of sickle cell crisis, no evidence of sickle cell crisis. Checked on ISTOP, no record of narcotic prescription for patient. No record of patient in the pharmacy that patient reports she receives medication from.    4/30: Patient's neurological exam is intact, AOX3, follows all commands. no neurological deficit change of mental status or headache. VS stable. Low suspicion for stroke given resolved symptoms. Patient continues to refuse repeat CT head and MRI brain, also refused CT chest/abdomen and pelvis. Patient education on importance of imaging studies/workup provided to patient. Patient verbalized understanding of risks of refusing further imaging and treatments. Psychiatry team re-evaluated patient, No psychiatric contraindications to discharge, recommended to continue home antipsychotic medications. Patient is stable for discharge, she agrees to follow up outpatient.        NEURO: Continue close monitoring for neurologic deterioration, permissive HTN, titrate statin to LDL goal less than 70, Patient refused MRI Brain w/o, patient education provided on reason/benefit of MRI and Repeat CT head, patient verbalized understanding and refused. Physical therapy/OT/Speech eval/treatment.     ANTITHROMBOTIC THERAPY: unable to start aspirin 81mg since patient refused repeat CT head 24 hours post tenecteplase ( given 4/28 6159)     PULMONARY: CXR clear, protecting airway, saturating well     CARDIOVASCULAR: check TTE, cardiac monitoring                              SBP goal: normotension    GASTROINTESTINAL:  passed dysphagia screen       Diet: regular    RENAL: BUN/Cr stable, good urine output      Na Goal: Greater than 135     Jerez: none    HEMATOLOGY: H/H stable, Platelets normal 373. Hematologist evaluated patient, recommended CT chest/abdomen and pelvis, patient refused, wanted to go home. Patient can follow up outpatient with hematology.     DVT ppx: Heparin s.c [] LMWH [] mechanical SCD [X]     ID: afebrile, no leukocytosis, chest xray clear lungs    OTHER: internal medicine consult appreciated for pain management and sickle cell history management. Dr. Jane documented that it is unclear if patient has sickle, as her Hgb is roughly 10 which is not consistent with SCD in crisis, retic count normal, Tbili normal all going against acute crisis. Patient reports her hematologist is Dr. Zack Maddox at [facility] in [place], [place] however cannot be found on their website and patient does not have contact information for this MD. Dr. Jane checked I-stop in [place] and [place] with no results, similarly pt's self-reported pharmacy does not have records of her filling from them.      DISPOSITION: D/C to home       CORE MEASURES:        Admission NIHSS: 4     Tenecteplase: [x] YES [] NO      LDL/HDL: 116/42     Depression Screen: yes     Statin Therapy: atorvastatin     Dysphagia Screen: [x] PASS [] FAIL     Smoking [] YES [x] NO      Afib [] YES [x] NO     Stroke Education [x] YES [] NO    Obtain screening lower extremity venous ultrasound in patients who meet 1 or more of the following criteria as patient is high risk for DVT/PE on admission:   [] History of DVT/PE  []Hypercoagulable states (Factor V Leiden, Cancer, OCP, etc. )  []Prolonged immobility (hemiplegia/hemiparesis/post operative or any other extended immobilization)  [] Transferred from outside facility (Rehab or Long term care)  [] Age </= to 50 ASSESSMENT:  31 yo F presented to [facility] for left face numbness, left arm and leg drift since 9:30PM. LKN 9PM. She reports a Hx of Sickle cell disease, Factor V leiden, 5 DVT in R leg, Seizures, PTSD, depression, PE, Prior stroke with R side deficits (no residual), heart surgery 2015, umbilical hernia repair, gallbladder removal, PFO. She states she is having blurry vision, headache 8/10, chest pain, pain in back, hips, joints, difficulty walking, feeling off balance, nausea. vomiting.   Patient reports allergy to CT contrast. She was initially refusing premedication protocol for CT imaging, so CTP and CTA delayed. She refused MRA due to claustrophobia despite being offered sedatives. CT non con did not show hemorrhage.  Tenecteplase was administered 4/29 2253. Patient's neurological exam improved on 4/29 with NIHSS 0. CTA Head and NECK unrevealing. patient refused repeat CT head 24 hours post tenecteplase ( given 4/28 2253). Multiple attempts to leave AMA, pt verbalized understanding risks of leaving AMA. psychiatry saw patient and states patient has no capacity to leave AMA. Internal medicine consulted for pain management and evaluation of sickle cell crisis, no evidence of sickle cell crisis. Checked on ISTOP, no record of narcotic prescription for patient. No record of patient in the pharmacy that patient reports she receives medication from.    4/30: Patient's neurological exam is intact, AOX3, follows all commands. no neurological deficit change of mental status or headache. VS stable. Low suspicion for stroke given resolved symptoms. Patient continues to refuse repeat CT head and MRI brain, also refused CT chest/abdomen and pelvis. Patient education on importance of imaging studies/workup provided to patient. Patient verbalized understanding of risks of refusing further imaging and treatments. Psychiatry team re-evaluated patient, No psychiatric contraindications to discharge, recommended to continue home antipsychotic medications. Patient is stable for discharge, she agrees to follow up outpatient.        NEURO: Continue close monitoring for neurologic deterioration, permissive HTN, titrate statin to LDL goal less than 70, Patient refused MRI Brain w/o, patient education provided on reason/benefit of MRI and Repeat CT head, patient verbalized understanding and refused. Physical therapy/OT/Speech eval/treatment.     ANTITHROMBOTIC THERAPY: unable to start aspirin 81mg since patient refused repeat CT head 24 hours post tenecteplase ( given 4/28 2173)     PULMONARY: CXR clear, protecting airway, saturating well     CARDIOVASCULAR: check TTE, cardiac monitoring                              SBP goal: normotension    GASTROINTESTINAL:  passed dysphagia screen       Diet: regular    RENAL: BUN/Cr stable, good urine output      Na Goal: Greater than 135     Jerez: none    HEMATOLOGY: H/H stable, Platelets normal 373. Hematologist evaluated patient, recommended CT chest/abdomen and pelvis, patient refused,  Patient education on importance of imaging studies/workup provided to patient. Patient verbalized understanding of risks of refusing further imaging and treatments. Patient wanted to go home. Patient agrees to follow up outpatient with hematology.     DVT ppx: Heparin s.c [] LMWH [] mechanical SCD [X]     ID: afebrile, no leukocytosis, chest xray clear lungs    OTHER: internal medicine consult appreciated for pain management and sickle cell history management. Dr. Jane documented that it is unclear if patient has sickle, as her Hgb is roughly 10 which is not consistent with SCD in crisis, retic count normal, Tbili normal all going against acute crisis. Patient reports her hematologist is Dr. Zack Maddox at [facility] in [place], [place] however cannot be found on their website and patient does not have contact information for this MD. Dr. Jane checked I-stop in [place] and [place] with no results, similarly pt's self-reported pharmacy does not have records of her filling from them.      DISPOSITION: D/C to home       CORE MEASURES:        Admission NIHSS: 4     Tenecteplase: [x] YES [] NO      LDL/HDL: 116/42     Depression Screen: yes     Statin Therapy: atorvastatin     Dysphagia Screen: [x] PASS [] FAIL     Smoking [] YES [x] NO      Afib [] YES [x] NO     Stroke Education [x] YES [] NO    Obtain screening lower extremity venous ultrasound in patients who meet 1 or more of the following criteria as patient is high risk for DVT/PE on admission:   [] History of DVT/PE  []Hypercoagulable states (Factor V Leiden, Cancer, OCP, etc. )  []Prolonged immobility (hemiplegia/hemiparesis/post operative or any other extended immobilization)  [] Transferred from outside facility (Rehab or Long term care)  [] Age </= to 50
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
## 2023-04-30 NOTE — DISCHARGE NOTE PROVIDER - NSDCMRMEDTOKEN_GEN_ALL_CORE_FT
clonazePAM 2 mg oral tablet: 1 tab(s) orally once a day as needed for anxiety/psuedoseizures  gabapentin 800 mg oral tablet: 1 tab(s) orally 3 times a day 3 times a day  Metoprolol Tartrate 25 mg oral tablet: 1 tab(s) orally once a day  QUEtiapine 100 mg oral tablet: 1 tab(s) orally 3 times a day 3 times a day  traZODone 100 mg oral tablet: 2 tab(s) orally once a day (at bedtime) once a day (at bedtime)  Zestril 10 mg oral tablet: 1 tab(s) orally once a day

## 2023-04-30 NOTE — DISCHARGE NOTE NURSING/CASE MANAGEMENT/SOCIAL WORK - NSDCPEFALRISK_GEN_ALL_CORE
For information on Fall & Injury Prevention, visit: [email or web address] OR  [email or web address] OR  https://www.cdc.gov/steadi/patient.html

## 2023-04-30 NOTE — CONSULT NOTE ADULT - SUBJECTIVE AND OBJECTIVE BOX
Of note, patient states her name is Irvin Reed ( [birth year]).    33 yo F with a PMH of renal cell carcinoma (on Afinitor per patient), sickle cell disease, Factor V Leiden, seizures, depression, PTSD, DVT +/- PE, CVA with R sided deficits (no residual), and PFO who presented to [facility] for left face numbness, left arm and leg drift since 9:30PM two nights ago. She was last known well at 9PM. She states she . LKN 9PM. She reports a Hx of Sickle cell disease, Factor V leiden, 5 DVT in R leg, Seizures, PTSD, depression, PE, Prior stroke with R side deficits (no residual), heart surgery 2015, umbilical hernia repair, gallbladder removal, PFO. She states she is having blurry vision, headache 8/10, chest pain, pain in back, hips, joints, difficulty walking, feeling off balance, nausea. vomiting.     Patient reports allergy to CT contrast. She was initially refusing premedication protocol for CT imaging, so CTP and CTA delayed. She refused MRA due to claustrophobia despite being offered sedatives.  CT non con did not show hemorrhage.    Patient was within the time frame for tenecteplase. She had significant left sided deficits. BP was less than 180/105. Contraindications were reviewed with patient and ruled out. Consent, risk, benefits, alternatives discussion was held. Patient is not on any anticoagulants, no recent head trauma, no recent surgeries, no recent major injuries.  Tenecteplase was given at 22:53 by ED attending; neurology present at bedside. Delay in tenecteplase administration due to risk benefit discussion and need for patient consent as well as placement of venous access. Patient will be admitted to stroke service for monitoring, with hematology following for management of sickle cell.     Overnight, patient kept threatening to leave AMA. Stroke team at bedside multiple times overnight to discuss risks with patient. Patient also refused premedication for contrast allergy multiple times, delaying imaging, despite discussion of risks and benefits.     PMH:Sickle cell disease, Factor V leiden, 5 DVT in R leg, Seizures, PTSD, depression, PE, Prior stroke with R side deficits (no residual), heart surgery 2015, umbilical hernia repair, gallbladder removal. PFO              Allergies  Protonix (Hives)  IV Contrast (Hives)  Haldol (Anaphylaxis)  Reglan (Hives)  Zofran (Hives)    Intolerances        MEDICATIONS  (STANDING):  citalopram 20 milliGRAM(s) Oral <User Schedule>  gabapentin 800 milliGRAM(s) Oral three times a day  HYDROmorphone   Tablet 4 milliGRAM(s) Oral every 4 hours  hydroxyurea (Non - oncologic) 500 milliGRAM(s) Oral daily  lisinopril 10 milliGRAM(s) Oral daily  melatonin 5 milliGRAM(s) Oral at bedtime  metoprolol tartrate 25 milliGRAM(s) Oral daily  morphine ER Tablet 30 milliGRAM(s) Oral every 8 hours  prazosin 4 milliGRAM(s) Oral at bedtime  QUEtiapine 100 milliGRAM(s) Oral three times a day  traZODone 200 milliGRAM(s) Oral at bedtime    MEDICATIONS  (PRN):  clonazePAM  Tablet 2 milliGRAM(s) Oral <User Schedule> PRN anxiety/psuedoseizures  cyclobenzaprine 10 milliGRAM(s) Oral three times a day PRN Muscle Spasm  diphenhydrAMINE 50 milliGRAM(s) Oral every 4 hours PRN Rash and/or Itching    HOME MEDICATIONS:  Hydromorphone 4mg 6 times per day PRN  Hydroxyurea 500mg daily  Benadryl 50mg PRN nausea  Seroquel 100mg TID  Gabapentin 800mg TID  Flexeril 10mg TID PRN  Metoprolol 25mg daily  Lisinopril 10mg daily  MS contin 30 ER q8h  Prazosin 4mg QHS  Trazodone 200mg QHS  Celexa 20mg BID  Klonopin 2mg PRN anxiety  Melatonin 10mg QHS      PAST MEDICAL HISTORY:  Factor V Leiden  Sickle cell disease  Recurrent deep vein thrombosis (DVT)  ?"Almost" PE  Seizures  Renal cell carcinoma  CVA  PFO (patent foramen ovale)  PTSD/Depression      PAST SURGICAL HISTORY:  History of cholecystectomy  Hernia repair  Heart surgery      FAMILY HISTORY:  Sickle cell disease  CVA  Myocardial infarction  HTN  HLD  Heart Disease  Colon cancer (dad - early 40s)  Lung/Brain/Bone cancer (maternal grandmother - early 40s)      SOCIAL HISTORY: Uses marijuana. Occasional alcohol. No other illicit drugs. No tobacco use history.  Lives in [place] with her spouse. Currently visiting her sister but may consider staying permanently here.    REVIEW OF SYSTEMS:  CONSTITUTIONAL: no fever or chills  EYES/ENT: No visual changes; no throat pain  NECK: No pain or stiffness  RESPIRATORY: + SOB, dry cough  CARDIOVASCULAR: + chest pain  GASTROINTESTINAL: + abdominal pain. No N/V/D/C  GENITOURINARY: No dysuria or hematuria  NEUROLOGICAL: No numbness or focal weakness  SKIN: No itching, burning, rashes, or lesions  MSK: + diffuse joint pains  Allergy: no urticaria        T(F): --  HR: 70 (23 @ 10:00)  BP: 127/73 (23 @ 10:00)  RR: 20 (23 @ 10:00)  SpO2: 97% (23 @ 10:00)  Wt(kg): --    GENERAL: NAD, fatigued but alert  HEENT: EOMI, MMM, no oropharyngeal lesions or erythema appreciated  Pulm: no increased WOB, CTAB/L  CV: RRR, S1, S2, no m/g/r  ABDOMEN: soft, minimal diffuse TTP, ND, no masses felt, no HSM  MSK: nl ROM  EXTREMITIES: no appreciable edema in b/l LE  Neuro: A&Ox3, no focal deficits  SKIN: warm and dry, no visible rash                          9.5    6.93  )-----------( 373      ( 2023 06:54 )             32.0           141  |  102  |  10  ----------------------------<  104<H>  3.4<L>   |  27  |  0.70    Ca    9.1      2023 06:54    TPro  6.7  /  Alb  3.9  /  TBili  0.1<L>  /  DBili  x   /  AST  13  /  ALT  21  /  AlkPhos  95        Lactate Dehydrogenase, Serum: 262 U/L ( @ 16:23)       Of note, patient states her name is Irvin Reed ( [birth year]).    33 yo F with a PMH of renal cell carcinoma (on Afinitor per patient), sickle cell disease, Factor V Leiden, seizures, depression, PTSD, DVT +/- PE, CVA with R sided deficits (no residual), and PFO who presented to [facility] for left face numbness, left arm and leg drift since 9:30PM two nights ago. She was last known well at 9PM, but had diffuse pains starting about 3 days ago that she says are reminiscent of a sickle cell crisis. However, she then developed L sided weakness above, in addition to her blurry vision, headache, and diffuse pains in her body, especially joints, chest, abdomen, and back. She also endorsed nausea and vomiting. Her pains are still present, she notes SOB, and she vomited earlier today.    In the ED, she was given tenecteplase (tPA) and was noted to be hypertensive.    She notes that she was diagnosed with FVL in . She has been on warfarin ever since, but stopped 2-3 weeks ago due to "not feeling well." She has a history of multiple DVTs and "almost" PEs, last DVT in 2018. She also had her last CVA in 2019 with R sided weakness that resolved. She has had multiple exchange transfusions in the past and has been intubated, most recently about 9 months ago. She also gets sickle cell crises requiring hospitalization about every 6 months. She takes PO Dilaudid up to 6 times a day (usually 4-5 times a day when not in crisis), along with MS Contin 30 mg Q8H standing.  In terms of her RCC, it was diagnosed in . She had a Mediport placed a year prior due to sickle cell exchanges, but has also used it for RCC treatments. She has been on multiple prior treatments that she cannot remember, but most recently has been on Afinitor (everolimus). She states she is on it every 2 weeks, twice each week of treatment and was last on it about a week ago. She states she had a scan a couple months ago that showed it was in the pancreas and that it was being considered to be biopsied. She is not sure if it is in her bones, although she "feels like it is." She also states she has kidney stones, but does not have hematuria.    She states her PCP is Teri Pisano in [place], [place].  She states her Hematologist is Zack Maddox (also in [place], [place], working at [facility]) and she saw him about 1-2 weeks ago.        Allergies  Protonix (Hives)  IV Contrast (Hives)  Haldol (Anaphylaxis)  Reglan (Hives)  Zofran (Hives)    Intolerances        MEDICATIONS  (STANDING):  citalopram 20 milliGRAM(s) Oral <User Schedule>  gabapentin 800 milliGRAM(s) Oral three times a day  HYDROmorphone   Tablet 4 milliGRAM(s) Oral every 4 hours  hydroxyurea (Non - oncologic) 500 milliGRAM(s) Oral daily  lisinopril 10 milliGRAM(s) Oral daily  melatonin 5 milliGRAM(s) Oral at bedtime  metoprolol tartrate 25 milliGRAM(s) Oral daily  morphine ER Tablet 30 milliGRAM(s) Oral every 8 hours  prazosin 4 milliGRAM(s) Oral at bedtime  QUEtiapine 100 milliGRAM(s) Oral three times a day  traZODone 200 milliGRAM(s) Oral at bedtime    MEDICATIONS  (PRN):  clonazePAM  Tablet 2 milliGRAM(s) Oral <User Schedule> PRN anxiety/psuedoseizures  cyclobenzaprine 10 milliGRAM(s) Oral three times a day PRN Muscle Spasm  diphenhydrAMINE 50 milliGRAM(s) Oral every 4 hours PRN Rash and/or Itching    HOME MEDICATIONS:  Hydromorphone 4mg 6 times per day PRN  Hydroxyurea 500mg daily  Benadryl 50mg PRN nausea  Seroquel 100mg TID  Gabapentin 800mg TID  Flexeril 10mg TID PRN  Metoprolol 25mg daily  Lisinopril 10mg daily  MS contin 30 ER q8h  Prazosin 4mg QHS  Trazodone 200mg QHS  Celexa 20mg BID  Klonopin 2mg PRN anxiety  Melatonin 10mg QHS      PAST MEDICAL HISTORY:  Factor V Leiden  Sickle cell disease  Recurrent deep vein thrombosis (DVT)  ?"Almost" PE  Seizures  Renal cell carcinoma  CVA  PFO (patent foramen ovale)  PTSD/Depression      PAST SURGICAL HISTORY:  History of cholecystectomy  Hernia repair  Heart surgery      FAMILY HISTORY:  Sickle cell disease  CVA  Myocardial infarction  HTN  HLD  Heart Disease  Colon cancer (dad - early 40s)  Lung/Brain/Bone cancer (maternal grandmother - early 40s)      SOCIAL HISTORY: Uses marijuana. Occasional alcohol. No other illicit drugs. No tobacco use history.  Lives in [place] with her spouse. Currently visiting her sister but may consider staying permanently here.    REVIEW OF SYSTEMS:  CONSTITUTIONAL: no fever or chills  EYES/ENT: No visual changes; no throat pain  NECK: No pain or stiffness  RESPIRATORY: + SOB, dry cough  CARDIOVASCULAR: + chest pain  GASTROINTESTINAL: + abdominal pain. No N/V/D/C  GENITOURINARY: No dysuria or hematuria  NEUROLOGICAL: No numbness or focal weakness  SKIN: No itching, burning, rashes, or lesions  MSK: + diffuse joint pains  Allergy: no urticaria        T(F): --  HR: 70 (23 @ 10:00)  BP: 127/73 (23 @ 10:00)  RR: 20 (23 @ 10:00)  SpO2: 97% (23 @ 10:00)  Wt(kg): --    GENERAL: NAD, fatigued but alert  HEENT: EOMI, MMM, no oropharyngeal lesions or erythema appreciated  Pulm: no increased WOB, CTAB/L  CV: RRR, S1, S2, no m/g/r  ABDOMEN: soft, minimal diffuse TTP, ND, no masses felt, no HSM  MSK: nl ROM  EXTREMITIES: no appreciable edema in b/l LE  Neuro: A&Ox3, LUE 4/5 and LLE 3/5 strength (extension/flexion), RUE/RLE 5/5 strength (extension/flexion). Decreased sensation in LUE and LLE. CNs II-XII intact b/l  SKIN: warm and dry, no visible rash. L chest Mediport in place, NTTP, no erythema or edema                          9.5    6.93  )-----------( 373      ( 2023 06:54 )             32.0           141  |  102  |  10  ----------------------------<  104<H>  3.4<L>   |  27  |  0.70    Ca    9.1      2023 06:54    TPro  6.7  /  Alb  3.9  /  TBili  0.1<L>  /  DBili  x   /  AST  13  /  ALT  21  /  AlkPhos  95        Lactate Dehydrogenase, Serum: 262 U/L ( @ 16:23)

## 2023-04-30 NOTE — PROVIDER CONTACT NOTE (OTHER) - ACTION/TREATMENT ORDERED:
Opt out form completed. Providers and social work aware. To follow up to establish additional safety measures.
As per PA cont to monitor pt ok to hold 0200 dose now . If pt wakes and remains unchanged ok to give meds
As per PA cont to monitor pt ok to hold 0200 dilaudid . If pt wakes up and request may give if unchanged

## 2023-04-30 NOTE — BH CONSULTATION LIAISON PROGRESS NOTE - NSBHFUPINTERVALHXFT_PSY_A_CORE
pt states she is willing to stay in hospital, denies trying to leave. pt reported anxiety last night, restless, palpitations. pt took klonopin prns, with good effect. pt denies si/hi, feels depressed due to her med issues. denies psychosis, brenton.

## 2023-04-30 NOTE — DISCHARGE NOTE PROVIDER - CARE PROVIDER_API CALL
Angie Tellez (NP; RN; DNP)  NP in Family Health  [address] [place] [id number]  Phone: [phone]  Fax: [phone]  Follow Up Time: 1 week

## 2023-04-30 NOTE — BH CONSULTATION LIAISON PROGRESS NOTE - NSBHCHARTREVIEWVS_PSY_A_CORE FT
Vital Signs Last 24 Hrs  T(C): --  T(F): --  HR: 70 (30 Apr 2023 10:00) (70 - 83)  BP: 127/73 (30 Apr 2023 10:00) (96/67 - 144/91)  BP(mean): 89 (30 Apr 2023 10:00) (89 - 112)  RR: 20 (30 Apr 2023 10:00) (14 - 20)  SpO2: 97% (30 Apr 2023 10:00) (97% - 99%)    Parameters below as of 30 Apr 2023 10:00  Patient On (Oxygen Delivery Method): room air

## 2023-04-30 NOTE — PROGRESS NOTE ADULT - ATTENDING COMMENTS
Patient with no signs and symptoms of stroke. No confirmed sickle cell disease. Multiple attempts and request for strong sedatives. I explained to patient that her symptoms were not compatible with stroke or sickle cell crisis and that her blood work and clinical development did not warrant any more treatment at the hospital. She requested scripts for her psychiatric medication for which I provided under critical ryan since she had no ID to confirm her identity. I requested psychiatry help with the management and prescription of medications they consented  for me to send scripts as requested by patient.      I spent 40 minutes in the care and management of this patient.
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
## 2023-04-30 NOTE — PROVIDER CONTACT NOTE (OTHER) - BACKGROUND
Pt drowsy aroused with increased stimuli . SBP 97/64 Osat 100 pt offered dilaudid pt drifting back to sleep appears very drowsy.
Pt very drowsy . SBP 97/64 Pt requires increase stimuli
s/p tenecteplase 4/28 @22:53

## 2023-04-30 NOTE — PROGRESS NOTE ADULT - SUBJECTIVE AND OBJECTIVE BOX
THE PATIENT WAS SEEN AND EXAMINED BY ME WITH THE HOUSESTAFF AND STROKE TEAM DURING MORNING ROUNDS.   HPI:  Chief Complaint: 33 yo F w sickle cell disease p/w Left sided weakness    HPI: 33 yo F presented to [facility] for left face numbness, left arm and leg drift since 9:30PM. LKN 9PM. She reports a Hx of Sickle cell disease, Factor V leiden, 5 DVT in R leg, Seizures, PTSD, depression, PE, Prior stroke with R side deficits (no residual), heart surgery 2015, umbilical hernia repair, gallbladder removal, PFO. She states she is having blurry vision, headache 8/10, chest pain, pain in back, hips, joints, difficulty walking, feeling off balance, nausea. vomiting.     Patient reports allergy to CT contrast. She was initially refusing premedication protocol for CT imaging, so CTP and CTA delayed. She refused MRA due to claustrophobia despite being offered sedatives.  CT non con did not show hemorrhage.    Patient was within the time frame for tenecteplase. She had significant left sided deficits. BP was less than 180/105. Contraindications were reviewed with patient and ruled out. Consent, risk, benefits, alternatives discussion was held. Patient is not on any anticoagulants, no recent head trauma, no recent surgeries, no recent major injuries.  Tenecteplase was given at 22:53 by ED attending; neurology present at bedside. Delay in tenecteplase administration due to risk benefit discussion and need for patient consent as well as placement of venous access. Patient will be admitted to stroke service for monitoring, with hematology following for management of sickle cell.     Overnight, patient kept threatening to leave AMA. Stroke team at bedside multiple times overnight to discuss risks with patient. Patient also refused premedication for contrast allergy multiple times, delaying imaging, despite discussion of risks and benefits.     PMH:Sickle cell disease, Factor V leiden, 5 DVT in R leg, Seizures, PTSD, depression, PE, Prior stroke with R side deficits (no residual), heart surgery 2015, umbilical hernia repair, gallbladder removal. PFO  Allergy: CT contrast. Haldol (anaphylaxis). zofran, reglan, protonix, toradol .   Meds: Hydromorphone 4mg, hydroxyurea 500mg daily, benadryl 50mg PRN nausea, Seroquel 100mg TIS, Gabapentin 800mg TID, Flexeril 10mg TID PRN, Metoprolol 25mg daily, Lisinopril 10mg daily, MS contin 30 ER q8h, prazosin 4mg at bedtime, trazodone 200mg at bedtime, celexa 20mg BID, clonipin 2mg PRN anxiety, melatonin 10mg at bedtime  Travel: from [facility]  Hospitalizations: none recent  Immunized for covid  Surgical Hx: heart surgery, cholecystectomy, hernia repair    FH: dad had DM, colon ca, heart attack. Mother had heart attacks, strokes, HTN, sickle cell disease    SH: marijuana, occasional alcohol. no smoking      Allergies:  Protonix (Hives)  IV Contrast (Hives)  Haldol (Anaphylaxis)  Reglan (Hives)  Zofran (Hives)      (29 Apr 2023 01:27)      SUBJECTIVE: No events overnight.  No new neurologic complaints.  ROS reported negative unless otherwise noted.    citalopram 20 milliGRAM(s) Oral <User Schedule>  clonazePAM  Tablet 2 milliGRAM(s) Oral <User Schedule> PRN  cyclobenzaprine 10 milliGRAM(s) Oral three times a day PRN  diphenhydrAMINE 50 milliGRAM(s) Oral every 4 hours PRN  gabapentin 800 milliGRAM(s) Oral three times a day  HYDROmorphone   Tablet 4 milliGRAM(s) Oral every 4 hours  hydroxyurea (Non - oncologic) 500 milliGRAM(s) Oral daily  lisinopril 10 milliGRAM(s) Oral daily  melatonin 5 milliGRAM(s) Oral at bedtime  metoprolol tartrate 25 milliGRAM(s) Oral daily  morphine ER Tablet 30 milliGRAM(s) Oral every 8 hours  prazosin 4 milliGRAM(s) Oral at bedtime  QUEtiapine 100 milliGRAM(s) Oral three times a day  traZODone 200 milliGRAM(s) Oral at bedtime      PHYSICAL EXAM:   Vital Signs Last 24 Hrs  T(C): --  T(F): --  HR: 73 (30 Apr 2023 06:05) (73 - 87)  BP: 109/72 (30 Apr 2023 06:05) (96/67 - 148/98)  BP(mean): 99 (30 Apr 2023 02:00) (99 - 112)  RR: 18 (30 Apr 2023 06:05) (13 - 18)  SpO2: 98% (30 Apr 2023 06:05) (94% - 99%)    Parameters below as of 30 Apr 2023 06:05  Patient On (Oxygen Delivery Method): room air        General: No acute distress  HEENT: EOM intact, visual fields full  Abdomen: Soft, nontender, nondistended   Extremities: No edema    NEUROLOGICAL EXAM:  Mental status: Awake, alert, oriented x3, speech fluent, follows commands, no neglect, normal memory   Cranial Nerves: No facial asymmetry, no nystagmus, no dysarthria,  tongue midline  Motor exam: Normal tone, no drift, 5/5 RUE, 5/5 RLE, 5/5 LUE, 5/5 LLE, normal fine finger movements.  Sensation: Intact to light touch   Coordination/ Gait: No dysmetria, gait not tested    LABS:                        9.5    6.93  )-----------( 373      ( 30 Apr 2023 06:54 )             32.0    04-30    141  |  102  |  10  ----------------------------<  104<H>  3.4<L>   |  27  |  0.70    Ca    9.1      30 Apr 2023 06:54    TPro  6.7  /  Alb  3.9  /  TBili  0.1<L>  /  DBili  x   /  AST  13  /  ALT  21  /  AlkPhos  95  04-28  PT/INR - ( 29 Apr 2023 05:40 )   PT: 12.3 sec;   INR: 1.07 ratio         PTT - ( 29 Apr 2023 05:40 )  PTT:27.0 sec      IMAGING: Reviewed by me.    CT Brain Stroke Protocol (04.28.23 @ 22:32)   FINDINGS:  There is no acute intracranial hemorrhage, mass effect, hydrocephalus, or   midline shift.  There are no extra-axial collections.  The basal cisterns   are patent.    Sulci and ventricles appropriate for patient's age. There is an empty   sella.    The visualized paranasal sinuses and mastoid air cells are clear.    The calvarium is intact.    IMPRESSION:  No acute intracranial hemorrhage or mass effect.         CT Angio Neck Stroke Protocol w/ IV Cont (04.29.23 @ 10:46) >  IMPRESSION:    Study is limited by patient's body habitus  HEAD CT AND RAPID PERFUSION:    CT PERFUSION demonstrated:  No evidence of infarct. No tissue at risk. Focal region of T  Max greater   than 4 left temporal lobe  If symptoms persist consider follow up head CT or MRI, MRA  if no   contraindication.    CTA COW: Hypoplastic right A1 segment with both A2s filling from the   left. Both distal ICAs are seen.  CTA NECK: Patent, ECAs, ICAs, no  hemodynamically significant stenosis at    ICA origins by NASCET criteria.  Vertebral arteries not well visualized in the neck given limitations of   the scan.

## 2023-04-30 NOTE — DISCHARGE NOTE PROVIDER - NSRESEARCHGRANT_OVERRIDEREC_GEN_A_CORE
Daily Neurology Interventional Progress Note    Chief Complaint: Stuttering of speech    HPI: Yosvany Domingo who is a 61 y.o. male who presents to [facility] ED from home for stuttering of speech that started 1 hour ago. He was talking with his family and became anxious and short of breath, does have a significant history of anxiety in which he states he takes xanax for. Per patient he has these episodes about once a month and has come into the hospital for them in the past and was sent home. He does have chronic balance problems in which he used a cane for but did state that he felt his gait was off from his normal.  He denies any numbness or weakness in the face or extremities. He denies any recent illness or stressors. No data recorded     Past Medical History:   Diagnosis Date    Anxiety     Arthritis     Chronic kidney disease     Cirrhosis ([facility]     Diverticulitis     Diverticulosis     GERD (gastroesophageal reflux disease)     Hypertension     Other disorders of kidney and ureter in diseases classified elsewhere     Psychiatric problem     Stroke of unknown cause ([facility] 8/12/2021        Past Surgical History:   Procedure Laterality Date    ABDOMEN SURGERY      BACK SURGERY      cervical plate    CARDIAC CATHETERIZATION  2007?     CERVICAL DISC SURGERY      x 2 ---broken neck 7-2003    COLON SURGERY  2004    partial, due to diverticulitis    COLONOSCOPY      DILATATION, ESOPHAGUS      ENDOSCOPY, COLON, DIAGNOSTIC      FRACTURE SURGERY      Broke neck in 2003    NERVE BLOCK Bilateral 10/02/2017    Cervical Facet MBB at C4-5, C5-6, C6-7     [place] INJ,PARAVERTEBRAL L/S,1 LEVEL Bilateral 10/2/2017    C-FACET MBB C4-5, C5-6, C6-7 BILATERAL performed by Rickey Lee MD at [address] [place] [address] ENDOSCOPY  2019    UPPER GASTROINTESTINAL ENDOSCOPY Left 9/28/2020    EGD BAND LIGATION performed by Nathaly Claros MD at [facility]        Social History     Socioeconomic History    Marital status:      Spouse name: Not on file    Number of children: 2    Years of education: Not on file    Highest education level: Not on file   Occupational History    Not on file   Tobacco Use    Smoking status: Current Every Day Smoker     Packs/day: 0.50     Years: 25.00     Pack years: 12.50     Types: Cigarettes    Smokeless tobacco: Never Used    Tobacco comment: printed to avs   Vaping Use    Vaping Use: Never used   Substance and Sexual Activity    Alcohol use: Not Currently     Comment: Quit 15 years ago    Drug use: No    Sexual activity: Not Currently   Other Topics Concern    Not on file   Social History Narrative    Not on file     Social Determinants of Health     Financial Resource Strain: Low Risk     Difficulty of Paying Living Expenses: Not hard at all   Food Insecurity: No Food Insecurity    Worried About Running Out of Food in the Last Year: Never true    Kaleb of Food in the Last Year: Never true   Transportation Needs:     Lack of Transportation (Medical):      Lack of Transportation (Non-Medical):    Physical Activity:     Days of Exercise per Week:     Minutes of Exercise per Session:    Stress:     Feeling of Stress :    Social Connections:     Frequency of Communication with Friends and Family:     Frequency of Social Gatherings with Friends and Family:     Attends Zoroastrian Services:     Active Member of Clubs or Organizations:     Attends Club or Organization Meetings:     Marital Status:    Intimate Partner Violence:     Fear of Current or Ex-Partner:     Emotionally Abused:     Physically Abused:     Sexually Abused:          sodium chloride flush  5-40 mL Intravenous 2 times per day    [Held by provider] enoxaparin  40 mg Subcutaneous Daily    aspirin  81 mg Oral Daily    Or    aspirin  300 mg Rectal Daily    atorvastatin  40 mg Oral Nightly    DULoxetine  60 mg Oral Daily    gabapentin  300 mg Oral TID    spironolactone  100 mg Oral Daily     Continuous Infusions:   sodium chloride       PRN Meds:sodium chloride flush, sodium chloride, ondansetron **OR** ondansetron, polyethylene glycol, ALPRAZolam, cyclobenzaprine  Labs:  WBC (thou/mm3)   Date Value   08/13/2021 5.1     RBC   Date Value   08/13/2021 3.74 mill/mm3 (L)   02/22/2021 4.07 X10E12/L (L)     Hematocrit (%)   Date Value   08/13/2021 37.4 (L)     Hemoglobin (gm/dl)   Date Value   08/13/2021 12.2 (L)     Platelets (thou/mm3)   Date Value   08/13/2021 131     No components found for: SODIUM  No components found for: POTASSIUM  No components found for: CHLORIDE  Glucose (mg/dL)   Date Value   08/12/2021 108   02/22/2021 86     Calcium (mg/dL)   Date Value   08/12/2021 9.1     CO2 (meq/L)   Date Value   08/12/2021 22 (L)     BUN (mg/dL)   Date Value   08/12/2021 8     CREATININE (mg/dL)   Date Value   08/12/2021 0.8   . INR (no units)   Date Value   08/12/2021 1.22 (H)     Review of System:     Neuro: negative  Heart: no chest pain, dyspnea, palpitation  Lungs: no cough, sputum production, dyspnea  Psych: admits anxiety, sadness (2/2 recent loss of a friend)     Physical Exam:  VITAL SIGNS  BP 94/75   Pulse 88   Temp 98.6 °F (37 °C) (Oral)   Resp 14   Ht 5' 7\" (1.702 m)   Wt 246 lb 7.6 oz (111.8 kg)   SpO2 99%   BMI 38.60 kg/m²     Cognition:   Level of Alertness: awake, alert   Fund of Knowledge: intact  Attention/Concentration: normal  Cranial Nerves: cranial nerves II-XII are grossly intact  Language: repetition, naming, reading intact.   Dysarthria: none      Motor Exam:  BUE: 5/5, no drift  BLE: 5/5, no drift     Muscle Bulk: appropriate, no overt muscle waisting    Muscle Tone: normal throughout x4 extremities     DTR:   - Not tested, failed to relax    Sensory: Sensory intact with light touch throughout   Coordination: FNF intact  Gait: deferred        General: anxious, in no acute distress   Head: NC/AT  Eyes: sclarea w/o injection bilaterally, no drainage  Neck: Full ROM, supple   Lungs: symmetrical chest rise, no assesscory muscle use  Extremetites: no deformities    Skin: No rashes, no lesions  Psych: anxious, admits sadness (recent loss of friends)          Imaging:  Impression       1. No intracranial hemorrhage. 2. There is no significant change in the appearance of the brain compared to the prior study.               The finding of no hemorrhage was telephoned to [facility], the patient's nurse at 2:02 PM on 8/12/2021.           **This report has been created using voice recognition software. It may contain minor errors which are inherent in voice recognition technology. **       Final report electronically signed by Dr. Fany Ibrahim on 8/12/2021 2:04 PM             CTA HEAD AND NECK  Impression       1. 2.2 cm tangle of vessels in the inferior medial left frontal lobe. There is an overlying draining left cortical vein. This could represent a small developmental venous anomaly versus an arterial venous malformation. 2. No intracranial stenoses or occlusions. 3. Very mild atherosclerosis of the carotid bulbs without significant stenosis.                 **This report has been created using voice recognition software. It may contain minor errors which are inherent in voice recognition technology. **       Final report electronically signed by Dr. Fany Ibrahim on 8/12/2021 2:41 PM        MRI head w/o 8/12      Impression       1. No evidence of an acute infarct. 2. Cluster of flow voids in the inferior medial left frontal lobe measuring 2.1 cm consistent with a vascular abnormality. The brain parenchyma surrounding this has normal signal.   3. Mild severity chronic small vessel ischemic changes.           **This report has been created using voice recognition software. It may contain minor errors which are inherent in voice recognition technology. **       Final report electronically signed by  Jasmeet Castromilla on 8/13/2021 6:19 AM            Impression and Plan:    61, M, PMH significant for past stroke, HTN, CKD, cirrhosis, anxiety (on xanax), presented on 8/12 w/ stuttering speech occurring 1 hour prior to arrival w/ associated feeling of anxiousness & SOB, causing dificulty to get words out. Patient endorses that this happenes once a month, is seen for this, but is sent home. CTH negative for acute hemmorrage, CTA shows  2.2 cm tangle of vessels in the inferior medial left frontal lobe, suggestive of AVM, no stenosis or occlusions. MRI redemonstrates cluster of flow voids in the inferior medial L frontal lobe measuring 2.1 cm consistent w/ vascular abnormality, no acute infarct. On initial exam, patient anxious. NIH 0, remainder of exam unremarkable. Stroke workup  - LDL 34  - A1c 5.0  - ECHO --> awaiting official read      Interval Events 8/13/21   - No acute events overnight      DDX: SAH, ischemic stroke, anxiety attack  1. Expressive aphasia, now resolved.    - f/u MRI  - DSA today   - NIHSS q shift  - Maintian neuro checks   - Continue ASA and start plavix 75mg   - PT/OT/SLP   - Maintain telemetry, monitor for atrial-fib  - Maintain oxygenation saturation >94%  - CTH if severe HA or AMS   - Stroke education provided        2.) Incidental finding of 2.2 cm tangle of vessels in the inferior medial left frontal lobe on CTA, suggestive of AVM  - DSA today to characterized AVM --> doesn't carry the risky features   - F/u in 6 months in the interventional neurology clinic   - [id number] [address] for d/c today from our standpoint         Joe Cardenas PA-C  Interventional Neurology IMPROVE-DD Application Not Available

## 2023-04-30 NOTE — CONSULT NOTE ADULT - ASSESSMENT
31 yo F with a PMH of renal cell carcinoma (on Afinitor per patient), sickle cell disease, Factor V Leiden, seizures, depression, PTSD, DVT +/- PE, CVA with R sided deficits (no residual), and PFO who presented to [facility] for left face numbness, left arm and leg drift 2 nights prior to admission in the setting of presumed sickle cell disease, s/p tPA on day of admission, with Hematology/Oncology consulted for assistance in determining sickle cell status and RCC status.      #Diffuse Pains / (Presumed) History of Sickle Cell Disease  - Patient presents with diffuse pains prior to L sided weakness on day of admission  - Reported history of SCD s/p CVAs, and ACS requiring exchange transfusions and intubation  - Pt still notes persistent pains, currently on PO meds and asking for IV. She states she is on standing PO MS Contin and PRN PO Dilaudid at home  - However, iSTOP (according to patient's stated name of Irvin Reed,  89) shows no opiate prescriptions in [place] or any other [facility], including [place]  - iSTOP reference #[id number]  - Would defer pain management to primary team/internal medicine  - Patient does c/o persistent SOB, but has normal O2 saturation and CXR  - F/u Hb electrophoresis, although would be ideal to have resulted prior to discharge (to better determine patient's SCD status), does not have to be resulted prior to discharge      ****INCOMPLETE****     since 9:30PM two nights ago. She was last known well at 9PM. She was having L sided weakness, as well as blurry vision, headache, and diffuse pains in her body, especially joints, chest, abdomen, and back. She also endorsed nausea and vomiting. Her pains are still present, she notes SOB, and she vomited earlier today.    In the ED, she was given tenecteplase (tPA) and was noted to be hypertensive.    She notes that she was diagnosed with FVL in . She has been on warfarin ever since, but stopped 2-3 weeks ago due to "not feeling well." She has a history of multiple DVTs and "almost" PEs, last DVT in 2018. She also had her last CVA in 2019 with R sided weakness that resolved. She has had multiple exchange transfusions in the past and has been intubated, most recently about 9 months ago. She also gets sickle cell crises requiring hospitalization about every 6 months. She takes PO Dilaudid up to 6 times a day (usually 4-5 times a day when not in crisis), along with MS Contin 30 mg Q8H standing.  In terms of her RCC, it was diagnosed in . She has been on multiple prior treatments that she cannot remember, but most recently has been on Afinitor (everolimus). She states she is on it every 2 weeks, twice each week of treatment and was last on it about a week ago. She states she had a scan a couple months ago that showed it was in the pancreas and that it was being considered to be biopsied. She is not sure if it is in her bones, although she "feels like it is." She also states she has kidney stones, but does not have hematuria.    She states her PCP is Teri Pisano in [place], [place].  She states her Hematologist is Zack Maddox (also in [place], [place], working at [facility]) and she saw him about 1-2 weeks ago. 31 yo F with a PMH of renal cell carcinoma (on Afinitor per patient), sickle cell disease, Factor V Leiden, seizures, depression, PTSD, DVT +/- PE, CVA with R sided deficits (no residual), and PFO who presented to [facility] for left face numbness, left arm and leg drift 2 nights prior to admission in the setting of presumed sickle cell disease, s/p tPA on day of admission, with Hematology/Oncology consulted for assistance in determining sickle cell status and RCC status.    #Diffuse Pains / (Presumed) History of Sickle Cell Disease  - Patient presents with diffuse pains prior to L sided weakness on day of admission  - Reported history of SCD s/p CVAs, and ACS requiring exchange transfusions and intubation. States she was following with Dr. Zack Maddox (Hematology) in [place], [place] but there is no one by that name (also states PCP is Teri Pisano and there is no one there by that name that can be found either)  - Pt still notes persistent pains, currently on PO meds and asking for IV. She states she is on standing PO MS Contin and PRN PO Dilaudid at home  - However, iSTOP (according to patient's stated name of Irvin Reed,  89) shows no opiate prescriptions in [place] or any other state, including [place]  - iSTOP reference #[id number]  - Would defer pain management to primary team/internal medicine  - Patient does c/o persistent SOB, but has normal O2 saturation and CXR  - F/u Hb electrophoresis, and although would be ideal to have resulted prior to discharge (to better determine patient's SCD status), does not have to be resulted prior to discharge  - Behavioral Health following as well  - Please see RCC management below and attempt to obtain records from [place]    #(Presumed) History of RCC  - Pt states she was diagnosed in   - Multiple unknown prior treatments  - S/p Mediport in  for SCD exchange transfusions  - Reportedly follows with Dr. Zack Maddox in [place], [place] but he does not appear to be listed as a provider in that area  - Pt states there is a lesion recently seen in the pancreas and she is being considered for biopsy. Otherwise, unknown metastatic spread  - Pt states she is currently on Afinitor (everolimus). Pt states she is on it twice per week every other week. This is an unusual dosing, as it is normally a daily PO medication.  - Would recommend checking CT C/A/P with IV contrast to for evaluate other causes of patient's pains (i.e. metastatic RCC)  - Otherwise, attempt to get records from [place]  - Patient states she has father and MGM with history of malignancy in their 40s. Discussed with patient that she should consider genetic testing as an outpatient if this is true    #(Presumed) History of DVT  - Reported history of multiple DVT and was on warfarin but stopped a few weeks prior to admission due to generalized malaise. Primary team called reported pharmacy pt states and she is not listed there  - Would therefore hold off on starting A/C unless prior history and medication use is confirmed      Aryles Hedjar, MD, PGY-5  Hematology/Oncology Fellow  [facility]  Pager: [phone]  After 5PM and on weekends and holidays, please call the inpatient fellow on call.

## 2023-04-30 NOTE — DISCHARGE NOTE NURSING/CASE MANAGEMENT/SOCIAL WORK - PATIENT PORTAL LINK FT
You can access the FollowMyHealth Patient Portal offered by [facility] by registering at the following website: [email or web address]. By joining [facility]’s FollowMyHealth portal, you will also be able to view your health information using other applications (apps) compatible with our system.

## 2023-04-30 NOTE — DISCHARGE NOTE PROVIDER - HOSPITAL COURSE
33 yo F presented to [facility] for left face numbness, left arm and leg drift since 9:30PM. LKN 9PM. She reports a Hx of Sickle cell disease, Factor V leiden, 5 DVT in R leg, Seizures, PTSD, depression, PE, Prior stroke with R side deficits (no residual), heart surgery 2015, umbilical hernia repair, gallbladder removal, PFO. She states she is having blurry vision, headache 8/10, chest pain, pain in back, hips, joints, difficulty walking, feeling off balance, nausea. vomiting.   Patient reports allergy to CT contrast. She was initially refusing premedication protocol for CT imaging, so CTP and CTA delayed. She refused MRA due to claustrophobia despite being offered sedatives. CT non con did not show hemorrhage.  Tenecteplase was administered 4/29 2253. Patient's neurological exam improved on 4/29 with NIHSS 0. CTA Head and NECK unrevealing. patient refused repeat CT head 24 hours post tenecteplase ( given 4/28 2253). Multiple attempts to leave AMA, pt verbalized understanding risks of leaving AMA. psychiatry saw patient and states patient has no capacity to leave AMA. Internal medicine consulted for pain management and evaluation of sickle cell crisis, no evidence of sickle cell crisis. Checked on ISTOP, no record of narcotic prescription for patient. No record of patient in the pharmacy that patient reports she receives medication from. Continue home pain management recommended by internal medicine team.   4/30: Patient's neurological exam is intact, no neurological deficit change of mental status or headache. VS stable. Low suspicion for stroke given resolved symptoms. Patient continues to refuse repeat CT head and MRI brain. Patient can follow up outpatient with neurology and patient's own hematologist. Patient is stable for discharge. Psychiatry team evaluated patient, recommended to resume home antipsychotic medications.    CT Brain Stroke Protocol (04.28.23 @ 22:32)     IMPRESSION:  No acute intracranial hemorrhage or mass effect.       CT Angio Neck Stroke Protocol w/ IV Cont (04.29.23 @ 10:46)     IMPRESSION:    Study is limited by patient's body habitus  HEAD CT AND RAPID PERFUSION:    CT PERFUSION demonstrated:  No evidence of infarct. No tissue at risk. Focal region of T  Max greater   than 4 left temporal lobe  If symptoms persist consider follow up head CT or MRI, MRA  if no   contraindication.    CTA COW: Hypoplastic right A1 segment with both A2s filling from the   left. Both distal ICAs are seen.  CTA NECK: Patent, ECAs, ICAs, no  hemodynamically significant stenosis at    ICA origins by NASCET criteria.  Vertebral arteries not well visualized in the neck given limitations of   the scan.           33 yo F presented to [facility] for left face numbness, left arm and leg drift since 9:30PM. LKN 9PM. She reports a Hx of Sickle cell disease, Factor V leiden, 5 DVT in R leg, Seizures, PTSD, depression, PE, Prior stroke with R side deficits (no residual), heart surgery 2015, umbilical hernia repair, gallbladder removal, PFO. She states she is having blurry vision, headache 8/10, chest pain, pain in back, hips, joints, difficulty walking, feeling off balance, nausea. vomiting.   Patient reports allergy to CT contrast. She was initially refusing premedication protocol for CT imaging, so CTP and CTA delayed. She refused MRA due to claustrophobia despite being offered sedatives. CT non con did not show hemorrhage.  Tenecteplase was administered 4/29 2253. Patient's neurological exam improved on 4/29 with NIHSS 0. CTA Head and NECK unrevealing. patient refused repeat CT head 24 hours post tenecteplase ( given 4/28 2253). Multiple attempts to leave AMA, pt verbalized understanding risks of leaving AMA. Psychiatry team evaluated patient, recommended to resume home antipsychotic medications.  Internal medicine consulted for pain management and evaluation of sickle cell crisis, no evidence of sickle cell crisis. Checked on ISTOP, no record of narcotic prescription for patient. No record of patient in the pharmacy that patient reports she receives medication from. Continue home pain management recommended by internal medicine team.   4/30: Patient's neurological exam is intact, no neurological deficit change of mental status or headache. VS stable. Low suspicion for stroke given resolved symptoms. Patient continues to refuse repeat CT head and MRI brain. Patient can follow up outpatient with neurology and patient's own hematologist. Patient is stable for discharge.   CT Brain Stroke Protocol (04.28.23 @ 22:32)     IMPRESSION:  No acute intracranial hemorrhage or mass effect.       CT Angio Neck Stroke Protocol w/ IV Cont (04.29.23 @ 10:46)     IMPRESSION:    Study is limited by patient's body habitus  HEAD CT AND RAPID PERFUSION:    CT PERFUSION demonstrated:  No evidence of infarct. No tissue at risk. Focal region of T  Max greater   than 4 left temporal lobe  If symptoms persist consider follow up head CT or MRI, MRA  if no   contraindication.    CTA COW: Hypoplastic right A1 segment with both A2s filling from the   left. Both distal ICAs are seen.  CTA NECK: Patent, ECAs, ICAs, no  hemodynamically significant stenosis at    ICA origins by NASCET criteria.  Vertebral arteries not well visualized in the neck given limitations of   the scan.           33 yo F presented to [facility] for left face numbness, left arm and leg drift since 9:30PM. LKN 9PM. She reports a Hx of Sickle cell disease, Factor V leiden, 5 DVT in R leg, Seizures, PTSD, depression, PE, Prior stroke with R side deficits (no residual), heart surgery 2015, umbilical hernia repair, gallbladder removal, PFO. She states she is having blurry vision, headache 8/10, chest pain, pain in back, hips, joints, difficulty walking, feeling off balance, nausea. vomiting.   Patient reports allergy to CT contrast. She was initially refusing premedication protocol for CT imaging, so CTP and CTA delayed. She refused MRA due to claustrophobia despite being offered sedatives. CT non con did not show hemorrhage.  Tenecteplase was administered 4/29 2253. Patient's neurological exam improved on 4/29 with NIHSS 0. CTA Head and NECK unrevealing. patient refused repeat CT head 24 hours post tenecteplase ( given 4/28 2253). Multiple attempts to leave AMA, pt verbalized understanding risks of leaving AMA. Psychiatry team evaluated patient, recommended to resume home antipsychotic medications.  Internal medicine consulted for pain management and evaluation of sickle cell crisis, no evidence of sickle cell crisis. Checked on ISTOP, no record of narcotic prescription for patient. No record of patient in the pharmacy that patient reports she receives medication from. Continue home pain management recommended by internal medicine team. Hematologist evaluated patient, recommended CT chest/abdomen and pelvis, patient refused, wanted to go home. Patient can follow up outpatient.  4/30: Patient's neurological exam is intact, no neurological deficit change of mental status or headache. VS stable. Low suspicion for stroke given resolved symptoms. Patient continues to refuse repeat CT head and MRI brain. Patient can follow up outpatient with neurology and patient's own hematologist. Patient is stable for discharge.   CT Brain Stroke Protocol (04.28.23 @ 22:32)     IMPRESSION:  No acute intracranial hemorrhage or mass effect.       CT Angio Neck Stroke Protocol w/ IV Cont (04.29.23 @ 10:46)     IMPRESSION:    Study is limited by patient's body habitus  HEAD CT AND RAPID PERFUSION:    CT PERFUSION demonstrated:  No evidence of infarct. No tissue at risk. Focal region of T  Max greater   than 4 left temporal lobe  If symptoms persist consider follow up head CT or MRI, MRA  if no   contraindication.    CTA COW: Hypoplastic right A1 segment with both A2s filling from the   left. Both distal ICAs are seen.  CTA NECK: Patent, ECAs, ICAs, no  hemodynamically significant stenosis at    ICA origins by NASCET criteria.  Vertebral arteries not well visualized in the neck given limitations of   the scan.

## 2023-05-01 LAB
HGB A MFR BLD: 97.9 % — SIGNIFICANT CHANGE UP (ref 95.8–98)
HGB A2 MFR BLD: 2.1 % — SIGNIFICANT CHANGE UP (ref 2–3.2)
HGB C MFR BLD: 0 % — SIGNIFICANT CHANGE UP
HGB F MFR BLD: 0 % — SIGNIFICANT CHANGE UP (ref 0–1)
HGB OTHER MFR BLD ELPH: 0 % — SIGNIFICANT CHANGE UP
HGB S MFR BLD: 0 % — SIGNIFICANT CHANGE UP
SICKLE CELL DISEASE SCREENING TEST: NEGATIVE — SIGNIFICANT CHANGE UP

## 2023-07-16 NOTE — ED PROVIDER NOTE - NS ED MD TWO NIGHTS YN
Left leg pains for two days. She denies injury. She has done home remedies with little relief, but is worsening. Admits to stopping flonase two days ago   Yes

## 2024-01-16 NOTE — PATIENT PROFILE ADULT - HAVE YOU EXPERIENCED VIOLENCE OR A TRAUMATIC EVENT?
Refill Routing Note   Medication(s) are not appropriate for processing by [facility] for the following reason(s):      Medication outside of protocol    [facility] action(s):  Route Care Due:  None identified            Appointments  past 12m or future 3m with PCP    Date Provider   Last Visit   4/14/2023 Hever Arreola MD   Next Visit   2/27/2024 Hever Arreola MD   ED visits in past 90 days: 0        Note composed:2:57 PM 01/16/2024           yes

## 2024-08-17 NOTE — STROKE CODE NOTE - NIH STROKE SCALE: 8. SENSORY, QM
(1) Mild-to-moderate sensory loss; patient feels pinprick is less sharp or is dull on the affected side; or there is a loss of superficial pain with pinprick, but patient is aware of being touched Surgery Progress Note            PATIENT NAME: Alexus Harris     TODAY'S DATE: 8/17/2024, 9:22 AM    CHIEF COMPLAINT:  POD#2 from robotic assisted lap hernia repair    SUBJECTIVE:    Pt seen and examined. No acute events overnight.  Pt voices some frustration this morning and states she has not seen Dr. Tenorio since surgery.  Pt reports some abdominal pain but reports it is better with pain medication.  Has been out of bed since surgery.  Still on clear liquid diet but seems to be tolerating well.  Charlton catheter still in place, having good UOP.  Passing flatus and having bowel movements.     OBJECTIVE:   VITALS:  /78   Pulse 88   Temp 98.2 °F (36.8 °C) (Temporal)   Resp 16   Ht 1.562 m (5' 1.5\")   Wt 81.9 kg (180 lb 8 oz)   SpO2 92%   BMI 33.55 kg/m²      INTAKE/OUTPUT:      Intake/Output Summary (Last 24 hours) at 8/17/2024 0922  Last data filed at 8/17/2024 0616  Gross per 24 hour   Intake 3994.18 ml   Output 5700 ml   Net -1705.82 ml                 CONSTITUTIONAL:  awake and alert.  No acute distress  CARDIOVASCULAR:  regular rate and rhythm   LUNGS:  clear to auscultation  ABDOMEN:   soft, non distended, some tenderness to palpation in upper abdomen, bowel sounds present.  Incisions all intact.  EXTREMITIES:  negative    Data:  CBC:   Lab Results   Component Value Date/Time    WBC 16.3 08/17/2024 05:49 AM    RBC 4.78 08/17/2024 05:49 AM    RBC 4.74 02/08/2024 05:30 AM    HGB 13.2 08/17/2024 05:49 AM    HCT 40.6 08/17/2024 05:49 AM    MCV 84.9 08/17/2024 05:49 AM    MCH 27.6 08/17/2024 05:49 AM    MCHC 32.5 08/17/2024 05:49 AM    RDW 14.0 08/17/2024 05:49 AM     08/17/2024 05:49 AM    MPV 10.2 08/17/2024 05:49 AM     BMP:    Lab Results   Component Value Date/Time     08/17/2024 05:49 AM    K 4.2 08/17/2024 05:49 AM     08/17/2024 05:49 AM    CO2 21 08/17/2024 05:49 AM    BUN 10 08/17/2024 05:49 AM    CREATININE 0.6 08/17/2024 05:49 AM    CALCIUM 8.8 08/17/2024 05:49 AM     LABGLOM >90 08/17/2024 05:49 AM    GLUCOSE 157 08/17/2024 05:49 AM    GLUCOSE 125 02/09/2024 05:16 AM       Radiology Review:        ASSESSMENT   Patient Active Problem List   Diagnosis    Diabetes mellitus type 2, insulin dependent (HCC)    Hyperlipidemia    Hypertension, essential    Palpitations    Morbidly obese (HCC)    S/P repair of ventral hernia    History of abdominal hysterectomy    History of cervical spinal arthrodesis     POD#2 from robotic lap ventral hernia repair    Plan  Remove vega now  Regular diet  Ok for D/C later today if able to void after vega removal and tolerating diet.  F/u in office with Dr Tenorio in 1-2 weeks.    Electronically signed by Yo Puente DO  on 8/17/2024 at 9:22 AM

## 2025-03-06 NOTE — H&P ADULT - NSHPPOAPULMEMBOLUS_GEN_A_CORE
Discussed with the nurse that worked with the patient on 3/3/25 with Dr. Koehler and confirmed that the patient declined the need for supplies for his heel wound. He only needs supplies for the ankle wound at this time. Call returned to Randi to inform her of this. No further questions or concerns.    
Randi from [facility] called about missing orders. She needs the orders for the left heel from the 3/3/25 appointment. Please fax to [phone] Attn: Randi.   
no